# Patient Record
Sex: FEMALE | Race: WHITE | Employment: OTHER | URBAN - METROPOLITAN AREA
[De-identification: names, ages, dates, MRNs, and addresses within clinical notes are randomized per-mention and may not be internally consistent; named-entity substitution may affect disease eponyms.]

---

## 2017-01-06 ENCOUNTER — OFFICE VISIT (OUTPATIENT)
Dept: INTERNAL MEDICINE CLINIC | Facility: CLINIC | Age: 72
End: 2017-01-06

## 2017-01-06 VITALS
HEIGHT: 60 IN | BODY MASS INDEX: 32.2 KG/M2 | DIASTOLIC BLOOD PRESSURE: 70 MMHG | WEIGHT: 164 LBS | SYSTOLIC BLOOD PRESSURE: 138 MMHG | HEART RATE: 83 BPM

## 2017-01-06 DIAGNOSIS — E78.00 HYPERCHOLESTEROLEMIA: Primary | ICD-10-CM

## 2017-01-06 DIAGNOSIS — R03.0 ELEVATED BP WITHOUT DIAGNOSIS OF HYPERTENSION: ICD-10-CM

## 2017-01-06 LAB
ALBUMIN SERPL BCP-MCNC: 4 G/DL (ref 3.5–4.8)
ALBUMIN/GLOB SERPL: 1.6 {RATIO} (ref 1–2)
ALP SERPL-CCNC: 59 U/L (ref 32–100)
ALT SERPL-CCNC: 18 U/L (ref 14–54)
ANION GAP SERPL CALC-SCNC: 9 MMOL/L (ref 0–18)
AST SERPL-CCNC: 17 U/L (ref 15–41)
BILIRUB SERPL-MCNC: 0.7 MG/DL (ref 0.3–1.2)
BUN SERPL-MCNC: 15 MG/DL (ref 8–20)
BUN/CREAT SERPL: 20.3 (ref 10–20)
CALCIUM SERPL-MCNC: 9.1 MG/DL (ref 8.5–10.5)
CHLORIDE SERPL-SCNC: 107 MMOL/L (ref 95–110)
CHOLEST SERPL-MCNC: 227 MG/DL (ref 110–200)
CO2 SERPL-SCNC: 25 MMOL/L (ref 22–32)
CREAT SERPL-MCNC: 0.74 MG/DL (ref 0.5–1.5)
GLOBULIN PLAS-MCNC: 2.5 G/DL (ref 2.5–3.7)
GLUCOSE SERPL-MCNC: 93 MG/DL (ref 70–99)
HDLC SERPL-MCNC: 60 MG/DL
LDLC SERPL CALC-MCNC: 134 MG/DL (ref 0–99)
NONHDLC SERPL-MCNC: 167 MG/DL
OSMOLALITY UR CALC.SUM OF ELEC: 293 MOSM/KG (ref 275–295)
POTASSIUM SERPL-SCNC: 4.1 MMOL/L (ref 3.3–5.1)
PROT SERPL-MCNC: 6.5 G/DL (ref 5.9–8.4)
SODIUM SERPL-SCNC: 141 MMOL/L (ref 136–144)
TRIGL SERPL-MCNC: 165 MG/DL (ref 1–149)

## 2017-01-06 PROCEDURE — G0463 HOSPITAL OUTPT CLINIC VISIT: HCPCS | Performed by: INTERNAL MEDICINE

## 2017-01-06 PROCEDURE — 99213 OFFICE O/P EST LOW 20 MIN: CPT | Performed by: INTERNAL MEDICINE

## 2017-01-06 NOTE — PATIENT INSTRUCTIONS
ASSESSMENT/PLAN:   Diagnoses and all orders for this visit:    Hypercholesterolemia  Cholesterol needs to be rechecked. Elevated BP without diagnosis of hypertension  BP doing well, continue to monitor.

## 2017-01-06 NOTE — PROGRESS NOTES
HPI:    Patient ID: Peña Owens is a 70year old female. HPI   Elevated BP  Patient is here for follow up of her elevated BP.  BP at home: not checking  On no meds, significant stress from her daughter who is bipolar alcoholic Exercise level: somewha Rash  Nitrofurantoin Macr*        Comment:Other reaction(s): NITROFURANTOIN MACROCRYSTAL  PHYSICAL EXAM:    Physical Exam   Vitals reviewed. Constitutional: She appears well-developed and well-nourished. HENT:   Head: Normocephalic and atraumatic.    Ca

## 2017-07-07 ENCOUNTER — OFFICE VISIT (OUTPATIENT)
Dept: INTERNAL MEDICINE CLINIC | Facility: CLINIC | Age: 72
End: 2017-07-07

## 2017-07-07 VITALS
HEART RATE: 63 BPM | DIASTOLIC BLOOD PRESSURE: 76 MMHG | SYSTOLIC BLOOD PRESSURE: 132 MMHG | BODY MASS INDEX: 32.98 KG/M2 | TEMPERATURE: 98 F | WEIGHT: 168 LBS | HEIGHT: 60 IN

## 2017-07-07 DIAGNOSIS — E78.00 HYPERCHOLESTEROLEMIA: Primary | ICD-10-CM

## 2017-07-07 LAB
CHOLEST SERPL-MCNC: 241 MG/DL (ref 110–200)
HDLC SERPL-MCNC: 51 MG/DL
LDLC SERPL CALC-MCNC: 148 MG/DL (ref 0–99)
NONHDLC SERPL-MCNC: 190 MG/DL
TRIGL SERPL-MCNC: 209 MG/DL (ref 1–149)

## 2017-07-07 PROCEDURE — 36415 COLL VENOUS BLD VENIPUNCTURE: CPT | Performed by: INTERNAL MEDICINE

## 2017-07-07 PROCEDURE — G0463 HOSPITAL OUTPT CLINIC VISIT: HCPCS | Performed by: INTERNAL MEDICINE

## 2017-07-07 PROCEDURE — G0009 ADMIN PNEUMOCOCCAL VACCINE: HCPCS | Performed by: INTERNAL MEDICINE

## 2017-07-07 PROCEDURE — 99213 OFFICE O/P EST LOW 20 MIN: CPT | Performed by: INTERNAL MEDICINE

## 2017-07-07 PROCEDURE — 90670 PCV13 VACCINE IM: CPT | Performed by: INTERNAL MEDICINE

## 2017-07-07 RX ORDER — FLUOCINONIDE 0.5 MG/G
OINTMENT TOPICAL
COMMUNITY
Start: 2017-06-28 | End: 2020-11-15 | Stop reason: ALTCHOICE

## 2017-07-07 NOTE — PROGRESS NOTES
HPI:    Patient ID: Nneka Dowling is a 67year old female. HPI  Hyperlipidemia  Patient here to follow up for their elevated cholesterol. Patient has been following low fat diet. Current medication no meds, trying to be more active.   Last cholesterol

## 2017-10-25 ENCOUNTER — OFFICE VISIT (OUTPATIENT)
Dept: INTERNAL MEDICINE CLINIC | Facility: CLINIC | Age: 72
End: 2017-10-25

## 2017-10-25 VITALS
SYSTOLIC BLOOD PRESSURE: 144 MMHG | HEIGHT: 60 IN | BODY MASS INDEX: 33.18 KG/M2 | HEART RATE: 78 BPM | TEMPERATURE: 98 F | WEIGHT: 169 LBS | DIASTOLIC BLOOD PRESSURE: 84 MMHG | OXYGEN SATURATION: 97 %

## 2017-10-25 DIAGNOSIS — M89.9 DISORDER OF BONE: ICD-10-CM

## 2017-10-25 DIAGNOSIS — R07.89 LEFT-SIDED CHEST WALL PAIN: Primary | ICD-10-CM

## 2017-10-25 DIAGNOSIS — M85.80 SENILE OSTEOPENIA: ICD-10-CM

## 2017-10-25 DIAGNOSIS — M85.859 OSTEOPENIA OF HIP: ICD-10-CM

## 2017-10-25 PROCEDURE — G0463 HOSPITAL OUTPT CLINIC VISIT: HCPCS | Performed by: INTERNAL MEDICINE

## 2017-10-25 PROCEDURE — 99213 OFFICE O/P EST LOW 20 MIN: CPT | Performed by: INTERNAL MEDICINE

## 2017-10-25 NOTE — PATIENT INSTRUCTIONS
ASSESSMENT/PLAN:   Diagnoses and all orders for this visit:    Left-sided chest wall pain  -     XR RIBS WITH CHEST (3 VIEWS), LEFT  (CPT=71101); Future  Patient with mechanical fall, possible rib fracture, should have x-ray of her ribs.  Continue the motri

## 2017-10-25 NOTE — PROGRESS NOTES
HPI:    Patient ID: Harriette Gitelman is a 67year old female. HPI  Fall at home  Patient was outside gardening 1 week ago. She stepped on loose dirt and struck her left chest on a landscaping wall. Has persistent pain in the area.  Taking ibuprofen for th

## 2017-10-26 ENCOUNTER — HOSPITAL ENCOUNTER (OUTPATIENT)
Dept: GENERAL RADIOLOGY | Age: 72
Discharge: HOME | End: 2017-10-26
Attending: INTERNAL MEDICINE
Payer: MEDICARE

## 2017-10-26 DIAGNOSIS — R07.89 LEFT-SIDED CHEST WALL PAIN: ICD-10-CM

## 2017-10-26 PROCEDURE — 71101 X-RAY EXAM UNILAT RIBS/CHEST: CPT | Performed by: INTERNAL MEDICINE

## 2017-10-30 ENCOUNTER — HOSPITAL ENCOUNTER (OUTPATIENT)
Dept: MAMMOGRAPHY | Age: 72
Discharge: HOME | End: 2017-10-30
Attending: OBSTETRICS & GYNECOLOGY
Payer: MEDICARE

## 2017-10-30 DIAGNOSIS — Z12.31 ENCOUNTER FOR SCREENING MAMMOGRAM FOR MALIGNANT NEOPLASM OF BREAST: ICD-10-CM

## 2017-10-30 PROCEDURE — 77067 SCR MAMMO BI INCL CAD: CPT | Performed by: OBSTETRICS & GYNECOLOGY

## 2017-11-06 ENCOUNTER — HOSPITAL ENCOUNTER (OUTPATIENT)
Dept: BONE DENSITY | Age: 72
Discharge: HOME | End: 2017-11-06
Attending: INTERNAL MEDICINE
Payer: MEDICARE

## 2017-11-06 DIAGNOSIS — M85.80 SENILE OSTEOPENIA: ICD-10-CM

## 2017-11-06 DIAGNOSIS — M89.9 DISORDER OF BONE: ICD-10-CM

## 2017-11-06 DIAGNOSIS — M85.859 OSTEOPENIA OF HIP: ICD-10-CM

## 2017-11-06 PROCEDURE — 77080 DXA BONE DENSITY AXIAL: CPT | Performed by: INTERNAL MEDICINE

## 2018-01-05 ENCOUNTER — OFFICE VISIT (OUTPATIENT)
Dept: INTERNAL MEDICINE CLINIC | Facility: CLINIC | Age: 73
End: 2018-01-05

## 2018-01-05 VITALS
WEIGHT: 169 LBS | HEART RATE: 69 BPM | DIASTOLIC BLOOD PRESSURE: 86 MMHG | HEIGHT: 60 IN | SYSTOLIC BLOOD PRESSURE: 145 MMHG | BODY MASS INDEX: 33.18 KG/M2

## 2018-01-05 DIAGNOSIS — E78.00 HYPERCHOLESTEROLEMIA: Primary | ICD-10-CM

## 2018-01-05 DIAGNOSIS — R03.0 ELEVATED BP WITHOUT DIAGNOSIS OF HYPERTENSION: ICD-10-CM

## 2018-01-05 LAB
ALBUMIN SERPL BCP-MCNC: 4 G/DL (ref 3.5–4.8)
ALBUMIN/GLOB SERPL: 1.7 {RATIO} (ref 1–2)
ALP SERPL-CCNC: 54 U/L (ref 32–100)
ALT SERPL-CCNC: 21 U/L (ref 14–54)
ANION GAP SERPL CALC-SCNC: 8 MMOL/L (ref 0–18)
AST SERPL-CCNC: 17 U/L (ref 15–41)
BILIRUB SERPL-MCNC: 0.5 MG/DL (ref 0.3–1.2)
BUN SERPL-MCNC: 13 MG/DL (ref 8–20)
BUN/CREAT SERPL: 17.1 (ref 10–20)
CALCIUM SERPL-MCNC: 9.1 MG/DL (ref 8.5–10.5)
CHLORIDE SERPL-SCNC: 107 MMOL/L (ref 95–110)
CHOLEST SERPL-MCNC: 243 MG/DL (ref 110–200)
CO2 SERPL-SCNC: 24 MMOL/L (ref 22–32)
CREAT SERPL-MCNC: 0.76 MG/DL (ref 0.5–1.5)
GLOBULIN PLAS-MCNC: 2.4 G/DL (ref 2.5–3.7)
GLUCOSE SERPL-MCNC: 92 MG/DL (ref 70–99)
HDLC SERPL-MCNC: 57 MG/DL
LDLC SERPL CALC-MCNC: 163 MG/DL (ref 0–99)
NONHDLC SERPL-MCNC: 186 MG/DL
OSMOLALITY UR CALC.SUM OF ELEC: 288 MOSM/KG (ref 275–295)
POTASSIUM SERPL-SCNC: 4.7 MMOL/L (ref 3.3–5.1)
PROT SERPL-MCNC: 6.4 G/DL (ref 5.9–8.4)
SODIUM SERPL-SCNC: 139 MMOL/L (ref 136–144)
TRIGL SERPL-MCNC: 114 MG/DL (ref 1–149)

## 2018-01-05 PROCEDURE — 36415 COLL VENOUS BLD VENIPUNCTURE: CPT | Performed by: INTERNAL MEDICINE

## 2018-01-05 PROCEDURE — 99213 OFFICE O/P EST LOW 20 MIN: CPT | Performed by: INTERNAL MEDICINE

## 2018-01-05 PROCEDURE — G0463 HOSPITAL OUTPT CLINIC VISIT: HCPCS | Performed by: INTERNAL MEDICINE

## 2018-01-05 RX ORDER — ATORVASTATIN CALCIUM 20 MG/1
20 TABLET, FILM COATED ORAL NIGHTLY
Qty: 30 TABLET | Refills: 3 | Status: SHIPPED | OUTPATIENT
Start: 2018-01-05 | End: 2018-04-29

## 2018-01-05 NOTE — PROGRESS NOTES
HPI:    Patient ID: Zaki Langley is a 67year old female. HPI  Elevated BP  Patient is here for follow up of elevated BP. BP at home: runs 130-140  Has been compliant with medications.   Exercise level: not active and has not been following low salt d 0.05 % External Ointment  Disp:  Rfl:      Allergies:  Amoxicillin                 Comment:Other reaction(s): Hives/Skin Rash  Nitrofurantoin Macr*        Comment:Other reaction(s): NITROFURANTOIN MACROCRYSTAL  PHYSICAL EXAM:    Physical Exam   Vitals revi

## 2018-01-05 NOTE — PATIENT INSTRUCTIONS
ASSESSMENT/PLAN:   Diagnoses and all orders for this visit:    Hypercholesterolemia  Cholesterol needs to be rechecked. Elevated BP without diagnosis of hypertension  BP too high, needs to watch diet, exercise more regularly and monitor at home.  Will f/u

## 2018-01-08 ENCOUNTER — PATIENT MESSAGE (OUTPATIENT)
Dept: INTERNAL MEDICINE CLINIC | Facility: CLINIC | Age: 73
End: 2018-01-08

## 2018-01-08 DIAGNOSIS — Z01.419 ENCOUNTER FOR ANNUAL ROUTINE GYNECOLOGICAL EXAMINATION: Primary | ICD-10-CM

## 2018-01-24 ENCOUNTER — OFFICE VISIT (OUTPATIENT)
Dept: INTERNAL MEDICINE CLINIC | Facility: CLINIC | Age: 73
End: 2018-01-24

## 2018-01-24 VITALS
HEART RATE: 80 BPM | HEIGHT: 60 IN | SYSTOLIC BLOOD PRESSURE: 136 MMHG | WEIGHT: 168 LBS | TEMPERATURE: 98 F | DIASTOLIC BLOOD PRESSURE: 80 MMHG | BODY MASS INDEX: 32.98 KG/M2

## 2018-01-24 DIAGNOSIS — L73.9 FOLLICULITIS: Primary | ICD-10-CM

## 2018-01-24 PROCEDURE — G0463 HOSPITAL OUTPT CLINIC VISIT: HCPCS | Performed by: INTERNAL MEDICINE

## 2018-01-24 PROCEDURE — 99213 OFFICE O/P EST LOW 20 MIN: CPT | Performed by: INTERNAL MEDICINE

## 2018-01-24 NOTE — PATIENT INSTRUCTIONS
ASSESSMENT/PLAN:   Diagnoses and all orders for this visit:    Folliculitis    patient with mild folliculitis, recommend that she use topical antibiotic salve like polysporin twice daily

## 2018-04-29 NOTE — TELEPHONE ENCOUNTER
Please review and advise regarding pended refill request as unable to refill per protocol.     Cholesterol Medications  Protocol Criteria:  · Appointment scheduled in the past 12 months or in the next 3 months  · ALT & LDL on file in the past 12 months  · A

## 2018-04-30 RX ORDER — ATORVASTATIN CALCIUM 20 MG/1
TABLET, FILM COATED ORAL
Qty: 90 TABLET | Refills: 1 | Status: SHIPPED | OUTPATIENT
Start: 2018-04-30 | End: 2018-06-01

## 2018-06-01 ENCOUNTER — OFFICE VISIT (OUTPATIENT)
Dept: INTERNAL MEDICINE CLINIC | Facility: CLINIC | Age: 73
End: 2018-06-01

## 2018-06-01 VITALS
SYSTOLIC BLOOD PRESSURE: 138 MMHG | BODY MASS INDEX: 32.79 KG/M2 | HEART RATE: 69 BPM | DIASTOLIC BLOOD PRESSURE: 79 MMHG | HEIGHT: 60 IN | WEIGHT: 167 LBS

## 2018-06-01 DIAGNOSIS — E78.00 HYPERCHOLESTEROLEMIA: Primary | ICD-10-CM

## 2018-06-01 DIAGNOSIS — S05.11XA ECCHYMOSIS OF RIGHT EYE, INITIAL ENCOUNTER: ICD-10-CM

## 2018-06-01 PROCEDURE — 99213 OFFICE O/P EST LOW 20 MIN: CPT | Performed by: INTERNAL MEDICINE

## 2018-06-01 PROCEDURE — 36415 COLL VENOUS BLD VENIPUNCTURE: CPT | Performed by: INTERNAL MEDICINE

## 2018-06-01 RX ORDER — ATORVASTATIN CALCIUM 20 MG/1
TABLET, FILM COATED ORAL
Qty: 90 TABLET | Refills: 3 | Status: SHIPPED | OUTPATIENT
Start: 2018-06-01 | End: 2019-09-04

## 2018-06-01 NOTE — PATIENT INSTRUCTIONS
ASSESSMENT/PLAN:   Hypercholesterolemia  Cholesterol to be rechecked on her meds.  Overall doing well  ecchymosis  This appears to be traumatic, not a concern, will check blood count

## 2018-06-01 NOTE — PROGRESS NOTES
HPI:    Patient ID: Chanell Alvarez is a 68year old female. HPI  Eye bruise  Patient developed spontaneous bruise under her right eye after working in the yard. Denies any trauma, doesn't hurt.      Hyperlipidemia  Patient here to follow up for their el person, place, and time. Skin:   Mild bruising under right eye           ASSESSMENT/PLAN:   Hypercholesterolemia  Cholesterol to be rechecked on her meds.  Overall doing well  ecchymosis  This appears to be traumatic, not a concern, will check blood count

## 2018-11-13 ENCOUNTER — OFFICE VISIT (OUTPATIENT)
Dept: INTERNAL MEDICINE CLINIC | Facility: CLINIC | Age: 73
End: 2018-11-13
Payer: MEDICARE

## 2018-11-13 VITALS
OXYGEN SATURATION: 98 % | SYSTOLIC BLOOD PRESSURE: 134 MMHG | TEMPERATURE: 98 F | BODY MASS INDEX: 28 KG/M2 | DIASTOLIC BLOOD PRESSURE: 79 MMHG | HEART RATE: 71 BPM | WEIGHT: 145 LBS

## 2018-11-13 DIAGNOSIS — Z00.00 ENCOUNTER FOR ANNUAL HEALTH EXAMINATION: ICD-10-CM

## 2018-11-13 DIAGNOSIS — R03.0 ELEVATED BP WITHOUT DIAGNOSIS OF HYPERTENSION: ICD-10-CM

## 2018-11-13 DIAGNOSIS — Z12.39 BREAST CANCER SCREENING: ICD-10-CM

## 2018-11-13 DIAGNOSIS — E78.00 HYPERCHOLESTEROLEMIA: Primary | ICD-10-CM

## 2018-11-13 DIAGNOSIS — M85.80 SENILE OSTEOPENIA: ICD-10-CM

## 2018-11-13 DIAGNOSIS — Z00.00 ENCOUNTER FOR MEDICARE ANNUAL WELLNESS EXAM: ICD-10-CM

## 2018-11-13 DIAGNOSIS — N39.0 URINARY TRACT INFECTION WITHOUT HEMATURIA, SITE UNSPECIFIED: ICD-10-CM

## 2018-11-13 PROCEDURE — 81003 URINALYSIS AUTO W/O SCOPE: CPT | Performed by: INTERNAL MEDICINE

## 2018-11-13 PROCEDURE — G0009 ADMIN PNEUMOCOCCAL VACCINE: HCPCS | Performed by: INTERNAL MEDICINE

## 2018-11-13 PROCEDURE — G0439 PPPS, SUBSEQ VISIT: HCPCS | Performed by: INTERNAL MEDICINE

## 2018-11-13 PROCEDURE — 90732 PPSV23 VACC 2 YRS+ SUBQ/IM: CPT | Performed by: INTERNAL MEDICINE

## 2018-11-13 NOTE — PATIENT INSTRUCTIONS
[facility] SCREENING SCHEDULE   Tests on this list are recommended by your physician but may not be covered, or covered at this frequency, by your insurer. Please check with your insurance carrier before scheduling to verify coverage.    PREVENTATI Colorectal Cancer Screening  Covered up to Age 76     Colonoscopy Screen   Covered every 10 years- more often if abnormal Colonoscopy due on 10/23/2024 Update Health Maintenance if applicable    Flex Sigmoidoscopy Screen  Covered every 5 years No results visit on 07/07/17   • PNEUMOCOCCAL VACC, 13 DEANDRE IM    Please get once after your 65th birthday    Pneumococcal 23 (Pneumovax)  Covered Once after 65 No orders found for this or any previous visit.  Please get once after your 65th birthday    Hepatitis B for excercise at least 30 minutes 3-4 times a week. Check blood pressures at different times on different days. Can purchase own blood pressure monitor. If not, check at local pharmacy. Bake foods more and grill occasionally. Avoid fried foods. No salt.  Use ot

## 2018-11-13 NOTE — PROGRESS NOTES
HPI:    Patient ID: Jadyn Mckinley is a 68year old female. HPI  Jadyn Mckinley is a 68year old female who presents for a complete physical exam. New to me here to also establish care. PCP has left practice. HPI:   Patient presents with:   Well Ad PM    ALKPHO 49 06/01/2018 01:32 PM    AST 19 06/01/2018 01:32 PM    ALT 19 06/01/2018 01:32 PM    BILT 0.6 06/01/2018 01:32 PM        Lab Results   Component Value Date/Time    CHOLEST 146 06/01/2018 01:32 PM    HDL 57 06/01/2018 01:32 PM    TRIG 101 06/0 brenda        Comment: Socially      Drug use: No      Sexual activity: Not on file    Other Topics      Concerns:         Service: Not Asked        Blood Transfusions: Not Asked        Caffeine Concern: Yes          Coffee, 6 cups per day         Occu discharge and vaginal pain. Skin: Negative for rash. Allergic/Immunologic: Negative for environmental allergies.    Neurological: Negative for dizziness, tremors, seizures, syncope, facial asymmetry, speech difficulty, weakness, light-headedness, numbne normal. She appears well-developed and well-nourished. No distress. HENT:   Head: Normocephalic and atraumatic. Right Ear: Tympanic membrane, external ear and ear canal normal. No lacerations. No drainage, swelling or tenderness. No foreign bodies.  No not injected. Right conjunctiva has no hemorrhage. Left conjunctiva is not injected. Left conjunctiva has no hemorrhage. No scleral icterus. Right eye exhibits normal extraocular motion and no nystagmus.  Left eye exhibits normal extraocular motion and no n Head (right side): No submental, no submandibular, no tonsillar, no preauricular, no posterior auricular and no occipital adenopathy present.         Head (left side): No submental, no submandibular, no tonsillar, no preauricular, no posterior auricular and Initial Annual Wellness).     Fall/Risk Assessment   She has been screened for Falls and is low risk: Fall/Risk Scorin    Cognitive Assessment   She had a completely normal cognitive assessment- see flowsheet entries      Functional Ability/Status   Julius Readings from Last 3 Encounters:  11/13/18 : 145 lb (65.8 kg)  06/01/18 : 167 lb (75.8 kg)  01/24/18 : 168 lb (76.2 kg)     Last Cholesterol Labs:   Lab Results   Component Value Date    CHOLEST 146 06/01/2018    HDL 57 06/01/2018    LDL 69 06/01/2018    T following:    Height as of 6/1/18: 5' (1.524 m). Weight as of this encounter: 145 lb (65.8 kg).     Medicare Hearing Assessment  (Required for AWV/SWV)    Questionnaire       Visual Acuity  Right Eye Visual Acuity: Corrected Right Eye Chart Acuity: 20/25 Good  How do you maintain positive mental well-being?: Social Interaction;Games; Visiting Family;Puzzles; Visiting Friends      This section provided for quick review of chart, separate sheet to patient  [address] [place] [address],[address] Intern 9/28/2018 Please get every year    Pneumococcal 13 (Prevnar)  Covered Once after 65 07/07/2017 Please get once after your 65th birthday    Pneumococcal 23 (Pneumovax)  Covered Once after 65 No vaccine history found Please get once after your 65th birthday file in [address]    Advance care planning including the explanation and discussion of advance directives standard forms performed Face to Face with patient and Family/surrogate (if present), and forms available to patient in AVS         She smoked tobacco in t MEDICAL INFORMATION:   She  has a past medical history of Allergic rhinitis, Foot fracture, left, Other and unspecified hyperlipidemia, Psoriasis, and Uterine cancer ([facility] [address]     She  has a past surgical history that includes bso, omentectomy w/jt and hyst problem and pelvic pain. Skin: Negative for rash. Allergic/Immunologic: Negative for environmental allergies.    Neurological: Negative for dizziness, tremors, seizures, syncope, facial asymmetry, speech difficulty, weakness, light-headedness, numbness membrane, external ear and ear canal normal. No lacerations. No drainage, swelling or tenderness. No foreign bodies. No mastoid tenderness. Tympanic membrane is not injected, not scarred, not perforated, not erythematous, not retracted and not bulging.  Tym present. Cardiovascular: Normal rate, regular rhythm, S1 normal, S2 normal, normal heart sounds, intact distal pulses and normal pulses. Pulses:       Carotid pulses are 2+ on the right side, and 2+ on the left side.        Radial pulses are 2+ on the posterior cervical adenopathy present. She has no axillary adenopathy. Right axillary: No pectoral and no lateral adenopathy present. Left axillary: No pectoral and no lateral adenopathy present.        Right: No supraclavicular adenopathy of these issues and agrees to the plan. Reinforced healthy diet, lifestyle, and exercise. Return in about 1 year (around 11/13/2019), or if symptoms worsen or fail to improve. Jayme Castleman.  Brody Frost MD, 11/13/2018     General Health     In the past six older at high risk There are no preventive care reminders to display for this patient. Update Health Maintenance if applicable    Chlamydia  Annually if high risk No results found for: CHLAMYDIA No flowsheet data found.     Screening Mammogram      Stas Patrick

## 2018-12-06 ENCOUNTER — HOSPITAL ENCOUNTER (OUTPATIENT)
Dept: MAMMOGRAPHY | Age: 73
Discharge: HOME | End: 2018-12-06
Attending: INTERNAL MEDICINE
Payer: MEDICARE

## 2018-12-06 DIAGNOSIS — Z12.39 BREAST CANCER SCREENING: ICD-10-CM

## 2018-12-06 PROCEDURE — 77067 SCR MAMMO BI INCL CAD: CPT | Performed by: INTERNAL MEDICINE

## 2018-12-06 PROCEDURE — 77063 BREAST TOMOSYNTHESIS BI: CPT | Performed by: INTERNAL MEDICINE

## 2019-03-19 ENCOUNTER — TELEPHONE (OUTPATIENT)
Dept: INTERNAL MEDICINE CLINIC | Facility: CLINIC | Age: 74
End: 2019-03-19

## 2019-03-19 DIAGNOSIS — M85.80 SENILE OSTEOPENIA: ICD-10-CM

## 2019-03-19 DIAGNOSIS — E78.00 HYPERCHOLESTEROLEMIA: Primary | ICD-10-CM

## 2019-03-19 DIAGNOSIS — E55.9 VITAMIN D DEFICIENCY: ICD-10-CM

## 2019-03-19 NOTE — TELEPHONE ENCOUNTER
June 27th @ 2pm pt is coming to see Dr Daniella Chacon   Pt would like to have blood test before that day.   Please place in order and call pt when orders are done

## 2019-03-20 PROBLEM — E55.9 VITAMIN D DEFICIENCY: Status: ACTIVE | Noted: 2019-03-20

## 2019-03-20 NOTE — TELEPHONE ENCOUNTER
Patient notified by phone orders in computer may go have labs drawn at [facility] before appt.

## 2019-03-25 ENCOUNTER — APPOINTMENT (OUTPATIENT)
Dept: LAB | Facility: HOSPITAL | Age: 74
End: 2019-03-25
Attending: INTERNAL MEDICINE
Payer: MEDICARE

## 2019-03-25 DIAGNOSIS — E55.9 VITAMIN D DEFICIENCY: ICD-10-CM

## 2019-03-25 DIAGNOSIS — E78.00 HYPERCHOLESTEROLEMIA: ICD-10-CM

## 2019-03-25 DIAGNOSIS — M85.80 SENILE OSTEOPENIA: ICD-10-CM

## 2019-03-25 LAB
25(OH)D3 SERPL-MCNC: 21.2 NG/ML (ref 30–100)
ALBUMIN SERPL-MCNC: 3.6 G/DL (ref 3.4–5)
ALBUMIN/GLOB SERPL: 1.2 {RATIO} (ref 1–2)
ALP LIVER SERPL-CCNC: 60 U/L (ref 55–142)
ALT SERPL-CCNC: 43 U/L (ref 13–56)
ANION GAP SERPL CALC-SCNC: 5 MMOL/L (ref 0–18)
AST SERPL-CCNC: 29 U/L (ref 15–37)
BILIRUB SERPL-MCNC: 0.3 MG/DL (ref 0.1–2)
BUN BLD-MCNC: 27 MG/DL (ref 7–18)
BUN/CREAT SERPL: 39.1 (ref 10–20)
CALCIUM BLD-MCNC: 9 MG/DL (ref 8.5–10.1)
CHLORIDE SERPL-SCNC: 111 MMOL/L (ref 98–107)
CHOLEST SMN-MCNC: 236 MG/DL (ref ?–200)
CO2 SERPL-SCNC: 26 MMOL/L (ref 21–32)
CREAT BLD-MCNC: 0.69 MG/DL (ref 0.55–1.02)
GLOBULIN PLAS-MCNC: 2.9 G/DL (ref 2.8–4.4)
GLUCOSE BLD-MCNC: 96 MG/DL (ref 70–99)
HDLC SERPL-MCNC: 71 MG/DL (ref 40–59)
LDLC SERPL CALC-MCNC: 153 MG/DL (ref ?–100)
M PROTEIN MFR SERPL ELPH: 6.5 G/DL (ref 6.4–8.2)
NONHDLC SERPL-MCNC: 165 MG/DL (ref ?–130)
OSMOLALITY SERPL CALC.SUM OF ELEC: 299 MOSM/KG (ref 275–295)
POTASSIUM SERPL-SCNC: 4.6 MMOL/L (ref 3.5–5.1)
SODIUM SERPL-SCNC: 142 MMOL/L (ref 136–145)
TRIGL SERPL-MCNC: 62 MG/DL (ref 30–149)
VLDLC SERPL CALC-MCNC: 12 MG/DL (ref 0–30)

## 2019-03-25 PROCEDURE — 80053 COMPREHEN METABOLIC PANEL: CPT

## 2019-03-25 PROCEDURE — 36415 COLL VENOUS BLD VENIPUNCTURE: CPT

## 2019-03-25 PROCEDURE — 80061 LIPID PANEL: CPT

## 2019-03-25 PROCEDURE — 82306 VITAMIN D 25 HYDROXY: CPT

## 2019-06-27 ENCOUNTER — OFFICE VISIT (OUTPATIENT)
Dept: INTERNAL MEDICINE CLINIC | Facility: CLINIC | Age: 74
End: 2019-06-27
Payer: MEDICARE

## 2019-06-27 VITALS
DIASTOLIC BLOOD PRESSURE: 77 MMHG | HEART RATE: 64 BPM | TEMPERATURE: 98 F | HEIGHT: 60 IN | SYSTOLIC BLOOD PRESSURE: 124 MMHG | OXYGEN SATURATION: 98 % | WEIGHT: 142 LBS | BODY MASS INDEX: 27.88 KG/M2

## 2019-06-27 DIAGNOSIS — E55.9 VITAMIN D DEFICIENCY: ICD-10-CM

## 2019-06-27 DIAGNOSIS — E78.00 HYPERCHOLESTEROLEMIA: Primary | ICD-10-CM

## 2019-06-27 PROCEDURE — 36415 COLL VENOUS BLD VENIPUNCTURE: CPT | Performed by: INTERNAL MEDICINE

## 2019-06-27 PROCEDURE — 99213 OFFICE O/P EST LOW 20 MIN: CPT | Performed by: INTERNAL MEDICINE

## 2019-06-27 NOTE — PATIENT INSTRUCTIONS
ASSESSMENT/PLAN:   Hypercholesterolemia  (primary encounter diagnosis) Check blood. Vitamin d deficiency Check blood. No orders of the defined types were placed in this encounter.       Meds This Visit:  Requested Prescriptions      No prescriptio

## 2019-07-07 ENCOUNTER — PATIENT MESSAGE (OUTPATIENT)
Dept: INTERNAL MEDICINE CLINIC | Facility: CLINIC | Age: 74
End: 2019-07-07

## 2019-07-08 NOTE — TELEPHONE ENCOUNTER
From: Harriette Gitelman  To: Jose E Azevedo MD  Sent: 7/7/2019 7:30 PM CDT  Subject: Referral Request    Dr. Keisha Birch,  About a year ago my gynecologist retired. Can you suggest a female gynecologist with offices at [facility]?    Thank you so much.

## 2019-08-29 ENCOUNTER — OFFICE VISIT (OUTPATIENT)
Dept: OBGYN CLINIC | Facility: CLINIC | Age: 74
End: 2019-08-29
Payer: MEDICARE

## 2019-08-29 VITALS
SYSTOLIC BLOOD PRESSURE: 137 MMHG | HEART RATE: 60 BPM | WEIGHT: 142 LBS | DIASTOLIC BLOOD PRESSURE: 81 MMHG | HEIGHT: 59.5 IN | BODY MASS INDEX: 28.25 KG/M2

## 2019-08-29 DIAGNOSIS — Z01.419 ENCOUNTER FOR GYNECOLOGICAL EXAMINATION WITHOUT ABNORMAL FINDING: Primary | ICD-10-CM

## 2019-08-29 DIAGNOSIS — N39.46 MIXED STRESS AND URGE URINARY INCONTINENCE: ICD-10-CM

## 2019-08-29 DIAGNOSIS — Z12.31 VISIT FOR SCREENING MAMMOGRAM: ICD-10-CM

## 2019-08-29 PROCEDURE — G0101 CA SCREEN;PELVIC/BREAST EXAM: HCPCS | Performed by: OBSTETRICS & GYNECOLOGY

## 2019-08-29 PROCEDURE — 99212 OFFICE O/P EST SF 10 MIN: CPT | Performed by: OBSTETRICS & GYNECOLOGY

## 2019-08-29 NOTE — PROGRESS NOTES
Betty Alexander is a 76year old female  No LMP recorded. Patient has had a hysterectomy. who presents for Patient presents with:  Gyn Exam: NP, Annual  She is a former patient of Jone Walker.   Pt also c/o leakage if urine when she coughs of lifts connections:        Talks on phone: Not on file        Gets together: Not on file        Attends Episcopal service: Not on file        Active member of club or organization: Not on file        Attends meetings of clubs or organizations: Not on file shortness of breath  Gastrointestinal:  denies heartburn, abdominal pain, diarrhea or constipation  Genitourinary:  denies dysuria, incontinence, abnormal vaginal discharge, vaginal itching  Musculoskeletal:  denies back pain.   Skin/Breast:  Denies any moses diagnosis)  Visit for screening mammogram  No orders of the defined types were placed in this encounter.     Requested Prescriptions      No prescriptions requested or ordered in this encounter     DYLLAN SCREENING BILAT (CPT=77067)

## 2019-09-04 RX ORDER — ATORVASTATIN CALCIUM 20 MG/1
TABLET, FILM COATED ORAL
Qty: 90 TABLET | Refills: 1 | Status: SHIPPED | OUTPATIENT
Start: 2019-09-04 | End: 2020-04-21

## 2019-09-04 NOTE — TELEPHONE ENCOUNTER
Refill passed per [facility] protocol.   Cholesterol Medications  Protocol Criteria:  · Appointment scheduled in the past 12 months or in the next 3 months  · ALT & LDL on file in the past 12 months  · ALT result < 80  · LDL result <130   Recent Outpat

## 2019-11-12 ENCOUNTER — OFFICE VISIT (OUTPATIENT)
Dept: INTERNAL MEDICINE CLINIC | Facility: CLINIC | Age: 74
End: 2019-11-12
Payer: MEDICARE

## 2019-11-12 VITALS
DIASTOLIC BLOOD PRESSURE: 72 MMHG | SYSTOLIC BLOOD PRESSURE: 133 MMHG | HEIGHT: 59 IN | WEIGHT: 144.19 LBS | HEART RATE: 68 BPM | OXYGEN SATURATION: 98 % | RESPIRATION RATE: 19 BRPM | TEMPERATURE: 97 F | BODY MASS INDEX: 29.07 KG/M2

## 2019-11-12 DIAGNOSIS — R03.0 ELEVATED BP WITHOUT DIAGNOSIS OF HYPERTENSION: ICD-10-CM

## 2019-11-12 DIAGNOSIS — Z71.85 VACCINE COUNSELING: ICD-10-CM

## 2019-11-12 DIAGNOSIS — E78.00 HYPERCHOLESTEROLEMIA: Primary | ICD-10-CM

## 2019-11-12 DIAGNOSIS — E55.9 VITAMIN D DEFICIENCY: ICD-10-CM

## 2019-11-12 DIAGNOSIS — Z00.00 ENCOUNTER FOR ANNUAL HEALTH EXAMINATION: ICD-10-CM

## 2019-11-12 PROCEDURE — 36415 COLL VENOUS BLD VENIPUNCTURE: CPT | Performed by: INTERNAL MEDICINE

## 2019-11-12 PROCEDURE — 99497 ADVNCD CARE PLAN 30 MIN: CPT | Performed by: INTERNAL MEDICINE

## 2019-11-12 PROCEDURE — G0439 PPPS, SUBSEQ VISIT: HCPCS | Performed by: INTERNAL MEDICINE

## 2019-11-12 NOTE — PATIENT INSTRUCTIONS
ASSESSMENT AND OTHER RELEVANT CHRONIC CONDITIONS:   Agatha Biggs is a 76year old female who presents for a Medicare Assessment.      PLAN SUMMARY:   Diagnoses and all orders for this visit:    Hypercholesterolemia  -     COMP METABOLIC PANEL (14)    Vac Routine EKG is not a screening covered service except at the Welcome to Medicare Visit    Abdominal aortic aneurysm screening (once between ages 73-68) IPPE only No results found for this or any previous visit.  Limited to patients who meet one of the johno CHLAMYDIA No flowsheet data found.     Screening Mammogram      Mammogram    Recommend Annually to at least age 76, and as needed after 76 Mammogram due on 12/06/2019 Please get this Mammogram regularly   Immunizations      Influenza  Covered Annually No or print using their home computer and printer. (the forms are also available in [address])  [email or web address]  This link also has information from the [facility] regarding Advance Directives.

## 2019-11-12 NOTE — PROGRESS NOTES
HPI:    Patient ID: Billy Briscoe is a 76year old female. HPI     Billy Briscoe is a 76year old female who presents for a complete physical exam.   HPI:   Patient presents with:  Wellness Visit: medicare annual well visit     Sees dermJv Christine.  06/27/2019 03:00 PM    CO2 25.0 06/27/2019 03:00 PM    CREATSERUM 0.73 06/27/2019 03:00 PM    CA 9.0 06/27/2019 03:00 PM    ALB 3.8 06/27/2019 03:00 PM    TP 6.7 06/27/2019 03:00 PM    ALKPHO 81 06/27/2019 03:00 PM    AST 52 (H) 06/27/2019 03:00 PM Alcohol/week: 0.0 standard drinks        Comment: Socially      Drug use: No      Sexual activity: Not Currently        Partners: Male    Other Topics      Concerns:        Caffeine Concern: Yes          Coffee, 6 cups per day          OB History   G Psychiatric/Behavioral: Negative for agitation, behavioral problems, confusion, decreased concentration, dysphoric mood, hallucinations, self-injury, sleep disturbance and suicidal ideas. The patient is not nervous/anxious and is not hyperactive.     All ot Right Ear: Tympanic membrane, external ear and ear canal normal. No lacerations. No drainage, swelling or tenderness. No foreign bodies. No mastoid tenderness.  Tympanic membrane is not injected, not scarred, not perforated, not erythematous, not retracted Eyes: Pupils are equal, round, and reactive to light. Conjunctivae, EOM and lids are normal. Right eye exhibits no chemosis, no discharge, no exudate and no hordeolum. No foreign body present in the right eye.  Left eye exhibits no chemosis, no discharge, n Abdominal: Soft. Bowel sounds are normal. She exhibits no distension, no fluid wave, no ascites and no mass. There is no hepatosplenomegaly, splenomegaly or hepatomegaly. There is no tenderness.  There is no rigidity, no rebound, no guarding and no CVA tend No prescriptions requested or ordered in this encounter       Imaging & Referrals:  None        ID#[id number]    HPI:   Stewart Rivers is a 76year old female who presents for a Medicare Subsequent Annual Wellness visit (Pt already had Initial Annual Wellness) Senile osteopenia     Psoriasis and similar disorder     Seborrheic dermatitis     Diverticulosis of colon     Elevated BP without diagnosis of hypertension     Vitamin D deficiency     Vaccine counseling    Wt Readings from Last 3 Encounters:  11/12/19 /72 (BP Location: Right arm, Patient Position: Sitting, Cuff Size: adult)   Pulse 68   Temp 97.4 °F (36.3 °C) (Oral)   Resp 19   Ht 4' 11\" (1.499 m)   Wt 144 lb 3.2 oz (65.4 kg)   SpO2 98%   BMI 29.12 kg/m²  Estimated body mass index is 29.12 kg/m² Immunization History   Administered Date(s) Administered   • FLU VACC High Dose 65 YRS & Older PRSV Free (23420) 10/06/2016   • Fluzone Vaccine Medicare () 10/06/2016   • Influenza 09/27/2017, 09/28/2018, 10/04/2019   • Pneumococcal (Prevnar 13) 07/07 06/27/2019 74        EKG - w/ Initial Preventative Physical Exam only, or if medically necessary Electrocardiogram date       Colorectal Cancer Screening      Colonoscopy Screen every 10 years Colonoscopy due on 10/23/2024 Update Health Maintenance if appl Only covered with a cut with metal- TD and TDaP Not covered by Medicare Part B 01/01/1998 This may be covered with your prescription benefits, but Medicare does not cover unless Medically needed    Zoster  Not covered by Medicare Part B No vaccine history

## 2019-12-03 ENCOUNTER — PATIENT MESSAGE (OUTPATIENT)
Dept: INTERNAL MEDICINE CLINIC | Facility: CLINIC | Age: 74
End: 2019-12-03

## 2019-12-03 DIAGNOSIS — H53.9 VISION ABNORMALITIES: Primary | ICD-10-CM

## 2019-12-10 ENCOUNTER — HOSPITAL ENCOUNTER (OUTPATIENT)
Dept: MAMMOGRAPHY | Age: 74
Discharge: HOME | End: 2019-12-10
Attending: OBSTETRICS & GYNECOLOGY
Payer: MEDICARE

## 2019-12-10 DIAGNOSIS — Z12.31 VISIT FOR SCREENING MAMMOGRAM: ICD-10-CM

## 2019-12-10 PROCEDURE — 77067 SCR MAMMO BI INCL CAD: CPT | Performed by: OBSTETRICS & GYNECOLOGY

## 2019-12-10 PROCEDURE — 77063 BREAST TOMOSYNTHESIS BI: CPT | Performed by: OBSTETRICS & GYNECOLOGY

## 2019-12-26 ENCOUNTER — HOSPITAL ENCOUNTER (OUTPATIENT)
Dept: ULTRASOUND IMAGING | Age: 74
Discharge: HOME | End: 2019-12-26
Attending: INTERNAL MEDICINE
Payer: MEDICARE

## 2019-12-26 ENCOUNTER — TELEPHONE (OUTPATIENT)
Dept: OTHER | Age: 74
End: 2019-12-26

## 2019-12-26 DIAGNOSIS — R74.8 ELEVATED LIVER ENZYMES: ICD-10-CM

## 2019-12-26 PROCEDURE — 76705 ECHO EXAM OF ABDOMEN: CPT | Performed by: INTERNAL MEDICINE

## 2019-12-26 NOTE — TELEPHONE ENCOUNTER
Lanney Holter from [address] [place] [address] called stating pt is scheduled for an MRI and needs copy of [facility] faxed to . Results faxed as requested.

## 2020-01-03 ENCOUNTER — PATIENT MESSAGE (OUTPATIENT)
Dept: INTERNAL MEDICINE CLINIC | Facility: CLINIC | Age: 75
End: 2020-01-03

## 2020-01-03 NOTE — TELEPHONE ENCOUNTER
Routed to Dr. Mer Carey  for advise, thanks.       Future Appointments   Date Time Provider Deonte Braun   1/9/2020 11:00 AM [facility]

## 2020-01-03 NOTE — TELEPHONE ENCOUNTER
From: Billy Briscoe  To: Nani Nguyễn. Daniella Chacon MD  Sent: 1/3/2020 3:53 PM CST  Subject: Non-Urgent Medical Question    I had an MRI done on December 29. Did you receive the results yet? Thank you. Efraín Banuelos

## 2020-01-04 NOTE — TELEPHONE ENCOUNTER
Office staff=please check if you receive the MRI results and give it to DR Barber Hernandez for review. Dr Thomas=please review the results and advise.

## 2020-01-06 ENCOUNTER — MED REC SCAN ONLY (OUTPATIENT)
Dept: INTERNAL MEDICINE CLINIC | Facility: CLINIC | Age: 75
End: 2020-01-06

## 2020-01-06 ENCOUNTER — TELEPHONE (OUTPATIENT)
Dept: INTERNAL MEDICINE CLINIC | Facility: CLINIC | Age: 75
End: 2020-01-06

## 2020-01-06 NOTE — TELEPHONE ENCOUNTER
Received results of MRI of abdomen today 1-6-20. MRI shows atypical hemangiomas which are benign lesions in the liver. Would recommend a follow-up in 6 months just to see if there is any changes.   If patient can bring her ultrasound done to the MRI aditya

## 2020-01-06 NOTE — TELEPHONE ENCOUNTER
Ad[facility] information and [facility]. Patient verbalized understanding.  She will call back in 6 for the order to follow-up, and will want to have it at [facility].

## 2020-03-24 ENCOUNTER — TELEPHONE (OUTPATIENT)
Dept: OPTOMETRY | Facility: CLINIC | Age: 75
End: 2020-03-24

## 2020-04-21 RX ORDER — ATORVASTATIN CALCIUM 20 MG/1
TABLET, FILM COATED ORAL
Qty: 90 TABLET | Refills: 1 | Status: SHIPPED | OUTPATIENT
Start: 2020-04-21 | End: 2020-11-19

## 2020-06-09 ENCOUNTER — PATIENT MESSAGE (OUTPATIENT)
Dept: INTERNAL MEDICINE CLINIC | Facility: CLINIC | Age: 75
End: 2020-06-09

## 2020-06-09 DIAGNOSIS — R93.2 ABNORMAL MRI, LIVER: Primary | ICD-10-CM

## 2020-06-10 NOTE — TELEPHONE ENCOUNTER
From: Nakul Nelson  To: Winter Díaz. Katrin Evans MD  Sent: 6/9/2020 3:53 PM CDT  Subject: Referral Request    Dr. Katrin Evans,  I will need a follow up 6 month MRI of my liver. It is due this month.  Can you please issue me a referral to have this done at [facility]

## 2020-07-09 ENCOUNTER — PATIENT MESSAGE (OUTPATIENT)
Dept: INTERNAL MEDICINE CLINIC | Facility: CLINIC | Age: 75
End: 2020-07-09

## 2020-07-09 DIAGNOSIS — E78.00 HYPERCHOLESTEROLEMIA: Primary | ICD-10-CM

## 2020-07-09 DIAGNOSIS — R93.2 ABNORMAL MRI, LIVER: ICD-10-CM

## 2020-07-09 NOTE — TELEPHONE ENCOUNTER
Since last blood was 11-19, with Dx. For MRI ad check cholesterol, should be OK with insurance. Due for blood for cholesterol. Can be fasting. Will see for physical 11-20.

## 2020-07-09 NOTE — TELEPHONE ENCOUNTER
From: Awilda Cardenas  To: Chela Chester. Tristen Murrieta MD  Sent: 7/9/2020 1:19 PM CDT  Subject: Non-Urgent Medical Question    Dr. Tristen Murrieta,    I am trying to arrange for an MRI of my liver.  I am told I need a CMP panel blood test first. Can I make an appointment in y

## 2020-08-04 ENCOUNTER — TELEPHONE (OUTPATIENT)
Dept: INTERNAL MEDICINE CLINIC | Facility: CLINIC | Age: 75
End: 2020-08-04

## 2020-08-04 ENCOUNTER — PATIENT MESSAGE (OUTPATIENT)
Dept: INTERNAL MEDICINE CLINIC | Facility: CLINIC | Age: 75
End: 2020-08-04

## 2020-08-04 NOTE — TELEPHONE ENCOUNTER
Kaylin from [facility] is requesting for an MRI order for the abdomen. Fax it #[phone]    Thank you.

## 2020-08-05 ENCOUNTER — APPOINTMENT (OUTPATIENT)
Dept: LAB | Facility: REFERENCE LAB | Age: 75
End: 2020-08-05
Attending: INTERNAL MEDICINE
Payer: MEDICARE

## 2020-08-05 DIAGNOSIS — R93.2 ABNORMAL MRI, LIVER: ICD-10-CM

## 2020-08-05 DIAGNOSIS — E78.00 HYPERCHOLESTEROLEMIA: ICD-10-CM

## 2020-08-05 LAB
ALBUMIN SERPL-MCNC: 3.6 G/DL (ref 3.4–5)
ALBUMIN/GLOB SERPL: 1.1 {RATIO} (ref 1–2)
ALP LIVER SERPL-CCNC: 60 U/L (ref 55–142)
ALT SERPL-CCNC: 32 U/L (ref 13–56)
ANION GAP SERPL CALC-SCNC: 6 MMOL/L (ref 0–18)
AST SERPL-CCNC: 17 U/L (ref 15–37)
BILIRUB SERPL-MCNC: 0.4 MG/DL (ref 0.1–2)
BUN BLD-MCNC: 22 MG/DL (ref 7–18)
BUN/CREAT SERPL: 29.3 (ref 10–20)
CALCIUM BLD-MCNC: 8.7 MG/DL (ref 8.5–10.1)
CHLORIDE SERPL-SCNC: 108 MMOL/L (ref 98–112)
CHOLEST SMN-MCNC: 180 MG/DL (ref ?–200)
CO2 SERPL-SCNC: 27 MMOL/L (ref 21–32)
CREAT BLD-MCNC: 0.75 MG/DL (ref 0.55–1.02)
GLOBULIN PLAS-MCNC: 3.2 G/DL (ref 2.8–4.4)
GLUCOSE BLD-MCNC: 97 MG/DL (ref 70–99)
HDLC SERPL-MCNC: 89 MG/DL (ref 40–59)
LDLC SERPL CALC-MCNC: 82 MG/DL (ref ?–100)
M PROTEIN MFR SERPL ELPH: 6.8 G/DL (ref 6.4–8.2)
NONHDLC SERPL-MCNC: 91 MG/DL (ref ?–130)
OSMOLALITY SERPL CALC.SUM OF ELEC: 295 MOSM/KG (ref 275–295)
PATIENT FASTING Y/N/NP: YES
PATIENT FASTING Y/N/NP: YES
POTASSIUM SERPL-SCNC: 4.8 MMOL/L (ref 3.5–5.1)
SODIUM SERPL-SCNC: 141 MMOL/L (ref 136–145)
TRIGL SERPL-MCNC: 47 MG/DL (ref 30–149)
VLDLC SERPL CALC-MCNC: 9 MG/DL (ref 0–30)

## 2020-08-05 PROCEDURE — 80053 COMPREHEN METABOLIC PANEL: CPT

## 2020-08-05 PROCEDURE — 36415 COLL VENOUS BLD VENIPUNCTURE: CPT

## 2020-08-05 PROCEDURE — 80061 LIPID PANEL: CPT

## 2020-08-05 NOTE — TELEPHONE ENCOUNTER
From: Awilda Cardenas  To: Chela Chester. Tristen Murrieta MD  Sent: 8/4/2020 5:09 PM CDT  Subject: Non-Urgent Medical Question    Dr. Tristen Murrieta,    I have an MRI set up for Thursday morning, August 6th.  Can I come into your office and have a blood test done tomorrow dory

## 2020-08-08 ENCOUNTER — PATIENT MESSAGE (OUTPATIENT)
Dept: INTERNAL MEDICINE CLINIC | Facility: CLINIC | Age: 75
End: 2020-08-08

## 2020-08-08 NOTE — TELEPHONE ENCOUNTER
[employer] message sent. From: Vamshi Gonzalez  To: Bascom Litten. Jamilah Rosa MD  Sent: 8/8/2020  8:50 AM CDT  Subject: Test Results Question    Have you received the results of the liver MRI I took on Thursday, August 6th? Hope you have received the results.     Chey Yi

## 2020-11-16 ENCOUNTER — OFFICE VISIT (OUTPATIENT)
Dept: INTERNAL MEDICINE CLINIC | Facility: CLINIC | Age: 75
End: 2020-11-16
Payer: MEDICARE

## 2020-11-16 VITALS
HEART RATE: 62 BPM | TEMPERATURE: 98 F | SYSTOLIC BLOOD PRESSURE: 152 MMHG | WEIGHT: 156.38 LBS | BODY MASS INDEX: 31.52 KG/M2 | HEIGHT: 59 IN | OXYGEN SATURATION: 98 % | DIASTOLIC BLOOD PRESSURE: 80 MMHG | RESPIRATION RATE: 18 BRPM

## 2020-11-16 DIAGNOSIS — R03.0 ELEVATED BP WITHOUT DIAGNOSIS OF HYPERTENSION: Primary | ICD-10-CM

## 2020-11-16 DIAGNOSIS — Z71.85 VACCINE COUNSELING: ICD-10-CM

## 2020-11-16 DIAGNOSIS — N30.00 ACUTE CYSTITIS WITHOUT HEMATURIA: ICD-10-CM

## 2020-11-16 DIAGNOSIS — E78.00 HYPERCHOLESTEROLEMIA: ICD-10-CM

## 2020-11-16 DIAGNOSIS — E61.1 LOW SERUM IRON: ICD-10-CM

## 2020-11-16 DIAGNOSIS — E55.9 VITAMIN D DEFICIENCY: ICD-10-CM

## 2020-11-16 DIAGNOSIS — Z00.00 ENCOUNTER FOR ANNUAL HEALTH EXAMINATION: ICD-10-CM

## 2020-11-16 DIAGNOSIS — L40.9 PSORIASIS AND SIMILAR DISORDER: ICD-10-CM

## 2020-11-16 DIAGNOSIS — Z12.31 VISIT FOR SCREENING MAMMOGRAM: ICD-10-CM

## 2020-11-16 DIAGNOSIS — K57.30 DIVERTICULOSIS OF COLON: ICD-10-CM

## 2020-11-16 DIAGNOSIS — M85.80 SENILE OSTEOPENIA: ICD-10-CM

## 2020-11-16 DIAGNOSIS — L21.9 SEBORRHEIC DERMATITIS: ICD-10-CM

## 2020-11-16 PROCEDURE — 3077F SYST BP >= 140 MM HG: CPT | Performed by: INTERNAL MEDICINE

## 2020-11-16 PROCEDURE — 99397 PER PM REEVAL EST PAT 65+ YR: CPT | Performed by: INTERNAL MEDICINE

## 2020-11-16 PROCEDURE — 96160 PT-FOCUSED HLTH RISK ASSMT: CPT | Performed by: INTERNAL MEDICINE

## 2020-11-16 PROCEDURE — 3008F BODY MASS INDEX DOCD: CPT | Performed by: INTERNAL MEDICINE

## 2020-11-16 PROCEDURE — 36415 COLL VENOUS BLD VENIPUNCTURE: CPT | Performed by: INTERNAL MEDICINE

## 2020-11-16 PROCEDURE — 81003 URINALYSIS AUTO W/O SCOPE: CPT | Performed by: INTERNAL MEDICINE

## 2020-11-16 PROCEDURE — 3079F DIAST BP 80-89 MM HG: CPT | Performed by: INTERNAL MEDICINE

## 2020-11-16 PROCEDURE — G0439 PPPS, SUBSEQ VISIT: HCPCS | Performed by: INTERNAL MEDICINE

## 2020-11-16 RX ORDER — CIPROFLOXACIN 500 MG/1
500 TABLET, FILM COATED ORAL 2 TIMES DAILY
Qty: 10 TABLET | Refills: 0 | Status: SHIPPED | OUTPATIENT
Start: 2020-11-16 | End: 2020-11-21

## 2020-11-16 NOTE — PROGRESS NOTES
CiraPI:    Patient ID: Vernon Leone is a 76year old female.     Vernon Leone is a 76year old female who presents for a complete physical ex • Ciprofloxacin HCl (CIPRO) 500 MG Oral Tab Take 1 tablet (500 mg total) by mouth 2 (two) times daily for 5 days.  10 tablet 0   • ATORVASTATIN 20 MG Oral Tab TAKE 1 TABLET(20 MG) BY MOUTH EVERY NIGHT 90 tablet 1   • Cholecalciferol (VITAMIN D3) 58530 units Wt Readings from Last 3 Encounters:  11/16/20 : 156 lb 6.4 oz (70.9 kg)  11/12/19 : 144 lb 3.2 oz (65.4 kg)  08/29/19 : 142 lb (64.4 kg)    BP Readings from Last 3 Encounters:  11/16/20 : 152/80  11/12/19 : 133/72  08/29/19 : 137/81    Labs:   Lab Results This is a new (Cipro always works. Last UTI was greater than a year ago.) problem. The current episode started in the past 7 days. The problem occurs every urination. The problem has been unchanged. There has been no fever. She is not sexually active.  The Neurological: Negative for dizziness, tremors, seizures, syncope, facial asymmetry, speech difficulty, weakness, light-headedness, numbness and headaches.    Psychiatric/Behavioral: Negative for agitation, behavioral problems, confusion, decreased concentra /80 (BP Location: Left arm, Patient Position: Sitting, Cuff Size: adult)   Pulse 62   Temp 97.7 °F (36.5 °C) (Oral)   Resp 18   Ht 4' 11\" (1.499 m)   Wt 156 lb 6.4 oz (70.9 kg)   SpO2 98%   BMI 31.59 kg/m²   BP Readings from Last 3 Encounters:  11/1 Eyes: Pupils are equal, round, and reactive to light. Conjunctivae, EOM and lids are normal. Right eye exhibits no chemosis, no discharge, no exudate and no hordeolum. No foreign body present in the right eye.  Left eye exhibits no chemosis, no discharge, n Abdominal: Soft. Bowel sounds are normal. She exhibits no distension, no fluid wave, no ascites and no mass. There is no hepatosplenomegaly, splenomegaly or hepatomegaly. There is no abdominal tenderness.  There is no rigidity, no rebound, no guarding and n Elevated bp without diagnosis of hypertension  (primary encounter diagnosis) ? Control. RN only visit in 1 week with bring both machines and to compare. Call in 2 weeks afterwards to give us readings on the blood pressure. Careful with diet and excercise a Preet Craven is a 76year old female who presents for a Medicare Subsequent Annual Wellness visit (Pt already had Initial Annual Wellness).     Fall/Risk Assessment   She has been screened for Falls and is low risk: Fall/Risk Scoring: (P) 1    Cognitive Mary Pang MD as PCP - General (Internal Medicine)  Mary Pang MD as PCP - [facility]    Patient Active Problem List:     Hypercholesterolemia     Senile osteopenia     Psoriasis and similar disorder     Seborrheic dermatitis     Diverticulosis o She  reports that she quit smoking about 50 years ago. Her smoking use included cigarettes. She has a 10.00 pack-year smoking history. She has never used smokeless tobacco. She reports current alcohol use. She reports that she does not use drugs.      EXAM: Right Eye Visual Acuity: Uncorrected Right Eye Chart Acuity: 20/25   Left Eye Visual Acuity: Uncorrected Left Eye Chart Acuity: 20/25   Both Eyes Visual Acuity: Uncorrected Both Eyes Chart Acuity: 20/25   Able To Tolerate Visual Acuity: Yes      Vaccinatio Return in about 3 months (around 2/16/2021), or if symptoms worsen or fail to improve. Zeke Castro.  Manuel Montero MD, 11/16/2020     General Health     In the past six months, have you lost more than 10 pounds without trying?: (P) 2 - No  Has your appetite been Pap: Every 3 yrs age 21-65 or Pap+HPV every 5 yrs age 33-67, age 72 and older at high risk There are no preventive care reminders to display for this patient.  Update Health Maintenance if applicable    Chlamydia  Annually if high risk No results found for

## 2020-11-16 NOTE — PATIENT INSTRUCTIONS
ASSESSMENT/PLAN:   Elevated bp without diagnosis of hypertension  (primary encounter diagnosis) ? Control. RN only visit in 1 week with bring both machines and to compare. Call in 2 weeks afterwards to give us readings on the blood pressure. Careful with d B/P.   [facility] SCREENING SCHEDULE   Tests on this list are recommended by your physician but may not be covered, or covered at this frequency, by your insurer. Please check with your insurance carrier before scheduling to verify coverage.    [place] abnormal Colonoscopy due on 10/23/2024 Update [facility] if applicable    Flex Sigmoidoscopy Screen  Covered every 5 years No results found for this or any previous visit. No flowsheet data found.      Fecal Occult Blood   Covered Annually No result your 65th birthday    Pneumococcal 23 (Pneumovax)  Covered Once after 72 Orders placed or performed in visit on 11/13/18   • PNEUMOCOCCAL IMM (PNEUMOVAX)    Please get once after your 65th birthday    Hepatitis B for Moderate/High Risk       No orders foun

## 2020-11-17 DIAGNOSIS — E55.9 VITAMIN D DEFICIENCY: ICD-10-CM

## 2020-11-17 RX ORDER — CHOLECALCIFEROL (VITAMIN D3) 1250 MCG
CAPSULE ORAL
Qty: 4 CAPSULE | Refills: 4 | Status: SHIPPED | OUTPATIENT
Start: 2020-11-17 | End: 2021-03-07

## 2020-11-19 RX ORDER — ATORVASTATIN CALCIUM 20 MG/1
20 TABLET, FILM COATED ORAL NIGHTLY
Qty: 90 TABLET | Refills: 1 | Status: SHIPPED | OUTPATIENT
Start: 2020-11-19 | End: 2021-01-26

## 2020-11-23 ENCOUNTER — NURSE ONLY (OUTPATIENT)
Dept: INTERNAL MEDICINE CLINIC | Facility: CLINIC | Age: 75
End: 2020-11-23
Payer: MEDICARE

## 2020-11-23 ENCOUNTER — TELEPHONE (OUTPATIENT)
Dept: INTERNAL MEDICINE CLINIC | Facility: CLINIC | Age: 75
End: 2020-11-23

## 2020-11-23 VITALS — HEART RATE: 70 BPM | DIASTOLIC BLOOD PRESSURE: 75 MMHG | SYSTOLIC BLOOD PRESSURE: 149 MMHG

## 2020-11-23 DIAGNOSIS — Z01.30 BLOOD PRESSURE CHECK: Primary | ICD-10-CM

## 2020-11-23 PROCEDURE — 3078F DIAST BP <80 MM HG: CPT | Performed by: INTERNAL MEDICINE

## 2020-11-23 PROCEDURE — 3077F SYST BP >= 140 MM HG: CPT | Performed by: INTERNAL MEDICINE

## 2020-11-23 RX ORDER — LISINOPRIL 10 MG/1
10 TABLET ORAL DAILY
Qty: 30 TABLET | Refills: 1 | Status: SHIPPED | OUTPATIENT
Start: 2020-11-23 | End: 2020-11-24

## 2020-11-23 NOTE — TELEPHONE ENCOUNTER
Blood pressures are slightly high. Would recommend lisinopril 10 mg once at night. Or once in the day. Would follow-up with a nurse visit in 10 days just to check blood pressure. Careful with diet and excercise at least 30 minutes 3-4 times a week.  Check

## 2020-11-24 RX ORDER — LISINOPRIL 10 MG/1
10 TABLET ORAL DAILY
Qty: 90 TABLET | Refills: 0 | Status: SHIPPED | OUTPATIENT
Start: 2020-11-24 | End: 2021-01-26

## 2020-11-24 NOTE — TELEPHONE ENCOUNTER
Spoke with the patient and advised her on Dr. Wilder Thapa orders and plan of care from below. Patient voiced understanding and agreed with the plan of care. Message routed to the call center to schedule a nurse visit for B/p check.

## 2020-12-01 ENCOUNTER — LAB REQUISITION (OUTPATIENT)
Dept: LAB | Facility: HOSPITAL | Age: 75
End: 2020-12-01
Payer: MEDICARE

## 2020-12-01 DIAGNOSIS — D48.5 NEOPLASM OF UNCERTAIN BEHAVIOR OF SKIN: ICD-10-CM

## 2020-12-01 PROCEDURE — 88305 TISSUE EXAM BY PATHOLOGIST: CPT | Performed by: DERMATOLOGY

## 2020-12-03 ENCOUNTER — PATIENT MESSAGE (OUTPATIENT)
Dept: INTERNAL MEDICINE CLINIC | Facility: CLINIC | Age: 75
End: 2020-12-03

## 2020-12-04 ENCOUNTER — NURSE ONLY (OUTPATIENT)
Dept: INTERNAL MEDICINE CLINIC | Facility: CLINIC | Age: 75
End: 2020-12-04
Payer: MEDICARE

## 2020-12-04 DIAGNOSIS — Z01.30 BP CHECK: Primary | ICD-10-CM

## 2020-12-04 NOTE — PROGRESS NOTES
Patient presents for BP check. Name and  verified. Patient states she did take her BP meds this morning. Patient's resting BP was 151/74  with a pulse of 67 via SpotMonitor. She also did leave her BP readings with us. Placed in your bin doctor.   BP

## 2020-12-07 NOTE — TELEPHONE ENCOUNTER
Glad that she gave her some readings different days. Blood pressures overall look good occasional mild highs but nothing persistently elevated. Will monitor for now.

## 2020-12-15 ENCOUNTER — HOSPITAL ENCOUNTER (OUTPATIENT)
Dept: BONE DENSITY | Age: 75
Discharge: HOME | End: 2020-12-15
Attending: INTERNAL MEDICINE
Payer: MEDICARE

## 2020-12-15 ENCOUNTER — HOSPITAL ENCOUNTER (OUTPATIENT)
Dept: MAMMOGRAPHY | Age: 75
Discharge: HOME | End: 2020-12-15
Attending: INTERNAL MEDICINE
Payer: MEDICARE

## 2020-12-15 DIAGNOSIS — M85.80 SENILE OSTEOPENIA: ICD-10-CM

## 2020-12-15 DIAGNOSIS — Z12.31 VISIT FOR SCREENING MAMMOGRAM: ICD-10-CM

## 2020-12-15 PROCEDURE — 77067 SCR MAMMO BI INCL CAD: CPT | Performed by: INTERNAL MEDICINE

## 2020-12-15 PROCEDURE — 77063 BREAST TOMOSYNTHESIS BI: CPT | Performed by: INTERNAL MEDICINE

## 2020-12-15 PROCEDURE — 77080 DXA BONE DENSITY AXIAL: CPT | Performed by: INTERNAL MEDICINE

## 2021-01-25 ENCOUNTER — IMMUNIZATION (OUTPATIENT)
Dept: LAB | Facility: HOSPITAL | Age: 76
End: 2021-01-25
Attending: EMERGENCY MEDICINE
Payer: MEDICARE

## 2021-01-25 DIAGNOSIS — Z23 NEED FOR VACCINATION: Primary | ICD-10-CM

## 2021-01-25 PROCEDURE — 0011A SARSCOV2 VAC 100MCG/0.5ML IM: CPT

## 2021-02-22 ENCOUNTER — IMMUNIZATION (OUTPATIENT)
Dept: LAB | Facility: HOSPITAL | Age: 76
End: 2021-02-22
Attending: PREVENTIVE MEDICINE
Payer: MEDICARE

## 2021-02-22 DIAGNOSIS — Z23 NEED FOR VACCINATION: Primary | ICD-10-CM

## 2021-02-22 PROCEDURE — 0012A SARSCOV2 VAC 100MCG/0.5ML IM: CPT

## 2021-02-25 ENCOUNTER — NURSE TRIAGE (OUTPATIENT)
Dept: INTERNAL MEDICINE CLINIC | Facility: CLINIC | Age: 76
End: 2021-02-25

## 2021-02-25 NOTE — TELEPHONE ENCOUNTER
Action Requested: Summary for Provider     []  Critical Lab, Recommendations Needed  [x] Need Additional Advice  []   FYI    []   Need Orders  [] Need Medications Sent to Pharmacy  []  Other     SUMMARY: Pt states she received her [address]ussain James

## 2021-02-26 NOTE — TELEPHONE ENCOUNTER
Spoke to patient patient states she had a rash, arm redness, and soreness, fatigue after receiving the second dose of COVID-19 vaccine. Symptoms have since resolved. Patient has appointment scheduled on 3/4/2021 with Dr. Katrin Evans.

## 2021-03-04 ENCOUNTER — OFFICE VISIT (OUTPATIENT)
Dept: INTERNAL MEDICINE CLINIC | Facility: CLINIC | Age: 76
End: 2021-03-04
Payer: MEDICARE

## 2021-03-04 VITALS
WEIGHT: 158.63 LBS | RESPIRATION RATE: 19 BRPM | OXYGEN SATURATION: 97 % | BODY MASS INDEX: 31.98 KG/M2 | HEIGHT: 59 IN | TEMPERATURE: 97 F | DIASTOLIC BLOOD PRESSURE: 73 MMHG | HEART RATE: 69 BPM | SYSTOLIC BLOOD PRESSURE: 127 MMHG

## 2021-03-04 DIAGNOSIS — Z71.85 VACCINE COUNSELING: ICD-10-CM

## 2021-03-04 DIAGNOSIS — Z12.31 VISIT FOR SCREENING MAMMOGRAM: ICD-10-CM

## 2021-03-04 DIAGNOSIS — E78.00 HYPERCHOLESTEROLEMIA: ICD-10-CM

## 2021-03-04 DIAGNOSIS — R03.0 ELEVATED BP WITHOUT DIAGNOSIS OF HYPERTENSION: Primary | ICD-10-CM

## 2021-03-04 DIAGNOSIS — R21 RASH: ICD-10-CM

## 2021-03-04 DIAGNOSIS — E55.9 VITAMIN D DEFICIENCY: ICD-10-CM

## 2021-03-04 PROCEDURE — 36415 COLL VENOUS BLD VENIPUNCTURE: CPT | Performed by: INTERNAL MEDICINE

## 2021-03-04 PROCEDURE — 99214 OFFICE O/P EST MOD 30 MIN: CPT | Performed by: INTERNAL MEDICINE

## 2021-03-04 RX ORDER — BETAMETHASONE DIPROPIONATE 0.05 %
1 OINTMENT (GRAM) TOPICAL NIGHTLY
Qty: 50 G | Refills: 1 | Status: SHIPPED | OUTPATIENT
Start: 2021-03-04

## 2021-03-04 NOTE — PATIENT INSTRUCTIONS
ASSESSMENT/PLAN:   Elevated bp without diagnosis of hypertension  (primary encounter diagnosis)Careful with diet and excercise at least 30 minutes 3-4 times a week. Check blood pressures at different times on different days.  Can purchase own blood pressure sign of shingles is often pain, burning, tingling, or itching on one part of your face or body. You may also feel as if you have the flu, with fever and chills. · A red rash with small blisters appears in a few days.  The rash may look as follows:   ? The complications during or after the infection comes out:  · Postherpetic neuralgia. This is the most common complication. It's more likely as people age, especially after age 61. It' is nerve pain at the place where the rash used to be.  It can range from mil pox.  The shingles vaccine  Two shingles vaccines are available to help prevent shingles or make it less painful:  · Zoster vaccine live (ZVL)  · Recombinant zoster vaccine (Blank Bryson). This is a newer vaccine that has been available since 2017.   You should get receiving this medicine?   Side effects that you should report to your doctor or health care professional as soon as possible:  · allergic reactions like skin rash, itching or hives, swelling of the face, lips, or tongue  · breathing problems  Side effects

## 2021-03-04 NOTE — PROGRESS NOTES
HPI:    Patient ID: Feli Guo is a 76year old female. Hypertension  Patient is here for follow up of hypertension. BP at home: same. Has been compliant with medications. Exercise level: trying to do more and has been following low salt diet. After second moderna vaccine. Had hives from chest above. Itchy. Now improved but still occasionally seems to itch. Denies any new products soaps lotions detergents i.e. nor any new foods that had eaten.   Denies shortness of breath or throat closing or Diagnosis Date   • Allergic rhinitis    • Foot fracture, left    • Ovarian ca [facility]) 1992    SIMBA/BSO   • Psoriasis       Past Surgical History:   Procedure Laterality Date   • BSO, OMENTECTOMY W/SIMBA     • HYSTERECTOMY      SIMBA/BSO      Family History   Prob Radial pulses are 2+ on the right side and 2+ on the left side. Dorsalis pedis pulses are 2+ on the right side and 2+ on the left side. Posterior tibial pulses are 2+ on the right side and 2+ on the left side.    Pulmonary/Chest: Effort n Imaging & Referrals:  None     [facility] -21 for physical.

## 2021-03-05 LAB — 25(OH)D3 SERPL-MCNC: 73.1 NG/ML (ref 30–100)

## 2021-03-06 DIAGNOSIS — E55.9 VITAMIN D DEFICIENCY: ICD-10-CM

## 2021-03-07 RX ORDER — CHOLECALCIFEROL (VITAMIN D3) 1250 MCG
CAPSULE ORAL
Qty: 12 CAPSULE | Refills: 0 | Status: SHIPPED | OUTPATIENT
Start: 2021-03-07

## 2021-07-20 RX ORDER — LISINOPRIL 10 MG/1
10 TABLET ORAL DAILY
Qty: 90 TABLET | Refills: 0 | Status: SHIPPED | OUTPATIENT
Start: 2021-07-20 | End: 2021-11-17

## 2021-07-20 RX ORDER — ATORVASTATIN CALCIUM 20 MG/1
TABLET, FILM COATED ORAL
Qty: 90 TABLET | Refills: 0 | Status: SHIPPED | OUTPATIENT
Start: 2021-07-20 | End: 2021-11-17

## 2021-08-17 ENCOUNTER — TELEPHONE (OUTPATIENT)
Dept: INTERNAL MEDICINE CLINIC | Facility: CLINIC | Age: 76
End: 2021-08-17

## 2021-08-17 ENCOUNTER — NURSE TRIAGE (OUTPATIENT)
Dept: INTERNAL MEDICINE CLINIC | Facility: CLINIC | Age: 76
End: 2021-08-17

## 2021-08-17 DIAGNOSIS — R30.0 DYSURIA: Primary | ICD-10-CM

## 2021-08-17 RX ORDER — CIPROFLOXACIN 500 MG/1
500 TABLET, FILM COATED ORAL 2 TIMES DAILY
Qty: 10 TABLET | Refills: 0 | Status: SHIPPED | OUTPATIENT
Start: 2021-08-17 | End: 2021-08-17

## 2021-08-17 RX ORDER — CIPROFLOXACIN 500 MG/1
500 TABLET, FILM COATED ORAL 2 TIMES DAILY
Qty: 10 TABLET | Refills: 0 | Status: SHIPPED | OUTPATIENT
Start: 2021-08-17 | End: 2021-08-18

## 2021-08-17 NOTE — TELEPHONE ENCOUNTER
----- Message from Marcell Hunter sent at 8/16/2021  2:23 PM CDT -----  Regarding: Other  Contact: [phone]  Dr. Jamilah Rosa,    I have the starting of a urinary infection. I have had enough of them to know the feeling and symptoms.  Can you please send a

## 2021-08-17 NOTE — TELEPHONE ENCOUNTER
Action Requested: Summary for Provider     []  Critical Lab, Recommendations Needed  [] Need Additional Advice  [x]   FYI    []   Need Orders  [] Need Medications Sent to Pharmacy  []  Other     SUMMARY: Patient complains of urinary burning that started ye

## 2021-08-18 ENCOUNTER — LAB ENCOUNTER (OUTPATIENT)
Dept: LAB | Facility: HOSPITAL | Age: 76
End: 2021-08-18
Attending: INTERNAL MEDICINE
Payer: MEDICARE

## 2021-08-18 DIAGNOSIS — R30.0 DYSURIA: ICD-10-CM

## 2021-08-18 LAB
BILIRUB UR QL: NEGATIVE
CLARITY UR: CLEAR
COLOR UR: YELLOW
GLUCOSE UR-MCNC: NEGATIVE MG/DL
HGB UR QL STRIP.AUTO: NEGATIVE
KETONES UR-MCNC: NEGATIVE MG/DL
NITRITE UR QL STRIP.AUTO: NEGATIVE
PH UR: 7 [PH] (ref 5–8)
PROT UR-MCNC: NEGATIVE MG/DL
SP GR UR STRIP: 1.01 (ref 1–1.03)
UROBILINOGEN UR STRIP-ACNC: 2

## 2021-08-18 PROCEDURE — 87088 URINE BACTERIA CULTURE: CPT

## 2021-08-18 PROCEDURE — 81001 URINALYSIS AUTO W/SCOPE: CPT

## 2021-08-18 PROCEDURE — 87086 URINE CULTURE/COLONY COUNT: CPT

## 2021-08-18 PROCEDURE — 87186 SC STD MICRODIL/AGAR DIL: CPT

## 2021-08-18 RX ORDER — CIPROFLOXACIN 500 MG/1
500 TABLET, FILM COATED ORAL 2 TIMES DAILY
Qty: 10 TABLET | Refills: 0 | Status: SHIPPED | OUTPATIENT
Start: 2021-08-18 | End: 2021-08-23

## 2021-08-18 NOTE — TELEPHONE ENCOUNTER
There is a's [facility] and there is also an [place] did you say [place] question I was told by MA to send to [facility].

## 2021-08-18 NOTE — TELEPHONE ENCOUNTER
Patient will like for you to send Cipro to [facility] because insurance doesn't cover prescriptions in [facility]. [facility] address is in the [place].

## 2021-08-18 NOTE — TELEPHONE ENCOUNTER
Can she drop off her urine at the lab. Orders will be placed. Make sure we do not have a but that is resistant. I will give her prescription for antibiotics.   But if she can wait on antibiotics until she gives us a urine for us or she can do the antibio

## 2021-10-24 ENCOUNTER — PATIENT MESSAGE (OUTPATIENT)
Dept: INTERNAL MEDICINE CLINIC | Facility: CLINIC | Age: 76
End: 2021-10-24

## 2021-10-24 DIAGNOSIS — U07.1 COVID: Primary | ICD-10-CM

## 2021-10-25 ENCOUNTER — NURSE ONLY (OUTPATIENT)
Dept: INFUSION CENTER | Age: 76
End: 2021-10-25
Attending: INTERNAL MEDICINE
Payer: MEDICARE

## 2021-10-25 ENCOUNTER — TELEPHONE (OUTPATIENT)
Dept: CASE MANAGEMENT | Age: 76
End: 2021-10-25

## 2021-10-25 VITALS
HEART RATE: 67 BPM | OXYGEN SATURATION: 100 % | TEMPERATURE: 98 F | RESPIRATION RATE: 20 BRPM | BODY MASS INDEX: 32 KG/M2 | WEIGHT: 158 LBS | SYSTOLIC BLOOD PRESSURE: 133 MMHG | DIASTOLIC BLOOD PRESSURE: 72 MMHG

## 2021-10-25 NOTE — TELEPHONE ENCOUNTER
Pt received PAB infusion at [facility] on 10-25-21 for COVID-19. Please follow-up with pt for post-infusion assessment and home monitoring if needed. Thank you.

## 2021-10-25 NOTE — PROGRESS NOTES
1036-Patient arrived for monoclonal infusion. She did not have any questions. 1045-24G angiocatheter inserted into patients left upper forearm. Placement checked. Patient tolerated well. 1056-Infusion started. Patient resting comfortably on the cart.

## 2021-10-25 NOTE — TELEPHONE ENCOUNTER
Future Appointments   Date Time Provider Deonte Braun   10/25/2021 10:45 AM HND MONOCLONAL TWO HND MONO INF [facility] [place]

## 2021-10-25 NOTE — TELEPHONE ENCOUNTER
From: Tashia Lugo  To: Vandana Ely MD  Sent: 10/24/2021 11:19 AM CDT  Subject: Covid-19    Dr. Lalo Ely,    I took a covid test today (10/24) which came back positive.  I started coughing slightly last Thursday and was sick all day Friday and Saturd

## 2021-10-25 NOTE — TELEPHONE ENCOUNTER
Wearing a mask and self quarentine Away from family. Check pulse ox. If SOB more or < 90% ER. Monoclonal AB infusion?

## 2021-10-26 NOTE — TELEPHONE ENCOUNTER
What  was your temp today? -    98.2    How did you take your temp?   forehead    What was your pulse ox today? N/A    Are you feeling short of breath today? No      Is the shortness of breath better, the same, or worse than yesterday?    Aarti Fraga

## 2021-11-17 RX ORDER — LISINOPRIL 10 MG/1
10 TABLET ORAL DAILY
Qty: 60 TABLET | Refills: 0 | Status: SHIPPED | OUTPATIENT
Start: 2021-11-17 | End: 2022-01-13

## 2021-11-17 RX ORDER — ATORVASTATIN CALCIUM 20 MG/1
20 TABLET, FILM COATED ORAL NIGHTLY
Qty: 60 TABLET | Refills: 0 | Status: SHIPPED | OUTPATIENT
Start: 2021-11-17 | End: 2022-02-01

## 2021-11-17 NOTE — TELEPHONE ENCOUNTER
Please review; protocol failed.  LOV 3/4/21 No labs since 11/16/20    Scheduled 11/18/21 for medicare physical

## 2021-11-18 ENCOUNTER — OFFICE VISIT (OUTPATIENT)
Dept: INTERNAL MEDICINE CLINIC | Facility: CLINIC | Age: 76
End: 2021-11-18
Payer: MEDICARE

## 2021-11-18 VITALS
BODY MASS INDEX: 33.19 KG/M2 | TEMPERATURE: 97 F | RESPIRATION RATE: 18 BRPM | WEIGHT: 164.63 LBS | HEIGHT: 59 IN | DIASTOLIC BLOOD PRESSURE: 75 MMHG | SYSTOLIC BLOOD PRESSURE: 116 MMHG | OXYGEN SATURATION: 98 % | HEART RATE: 65 BPM

## 2021-11-18 DIAGNOSIS — E61.1 LOW SERUM IRON: ICD-10-CM

## 2021-11-18 DIAGNOSIS — R03.0 ELEVATED BP WITHOUT DIAGNOSIS OF HYPERTENSION: ICD-10-CM

## 2021-11-18 DIAGNOSIS — L40.9 PSORIASIS AND SIMILAR DISORDER: ICD-10-CM

## 2021-11-18 DIAGNOSIS — Z00.00 ENCOUNTER FOR ANNUAL HEALTH EXAMINATION: ICD-10-CM

## 2021-11-18 DIAGNOSIS — Z71.85 VACCINE COUNSELING: ICD-10-CM

## 2021-11-18 DIAGNOSIS — M85.80 SENILE OSTEOPENIA: ICD-10-CM

## 2021-11-18 DIAGNOSIS — E78.00 HYPERCHOLESTEROLEMIA: Primary | ICD-10-CM

## 2021-11-18 DIAGNOSIS — Z12.31 VISIT FOR SCREENING MAMMOGRAM: ICD-10-CM

## 2021-11-18 DIAGNOSIS — D64.9 ANEMIA, UNSPECIFIED TYPE: ICD-10-CM

## 2021-11-18 DIAGNOSIS — K57.30 DIVERTICULOSIS OF COLON: ICD-10-CM

## 2021-11-18 DIAGNOSIS — E55.9 VITAMIN D DEFICIENCY: ICD-10-CM

## 2021-11-18 DIAGNOSIS — L21.9 SEBORRHEIC DERMATITIS: ICD-10-CM

## 2021-11-18 PROCEDURE — 81003 URINALYSIS AUTO W/O SCOPE: CPT | Performed by: INTERNAL MEDICINE

## 2021-11-18 PROCEDURE — G0439 PPPS, SUBSEQ VISIT: HCPCS | Performed by: INTERNAL MEDICINE

## 2021-11-18 PROCEDURE — 36415 COLL VENOUS BLD VENIPUNCTURE: CPT | Performed by: INTERNAL MEDICINE

## 2021-11-18 PROCEDURE — 99497 ADVNCD CARE PLAN 30 MIN: CPT | Performed by: INTERNAL MEDICINE

## 2021-11-18 NOTE — PATIENT INSTRUCTIONS
PLAN SUMMARY:   Diagnoses and all orders for this visit:    Hypercholesterolemia  -     LIPID PANEL; Future  -     COMP METABOLIC PANEL (14);  Future    Senile osteopenia  -     VITAMIN D; Future  -     TSH W REFLEX TO FREE T4; Future    Psoriasis and simil rice cooked  Macaroni  1.0/1 cup (cooked)  Rice, brown  2.4/1 cup (cooked)  Rice, polished  0.6/1 cup (cooked)  Spaghetti (regular) 1.0/1 cup (cooked)    Flours and grains  Bran, oat  8.3/oz  Bran, wheat  12.4/oz  Rolled oats  13.7/1 cup (cooked)    Nuts and chills.  Symptoms of diverticulosis and diverticulitis are similar to other conditions, such as appendicitis, ovarian cyst, peptic ulcer, Crohn’s disease, and irritable bowel syndrome – so the doctor may do tests such as x-rays, ultrasound, or endoscopy colon. The best way to prevent diverticulitis is to keep diverticulosis under control. That means eating a high fiber diet – which requires 20 to 35 grams of fiber each day. Fiber is found in grains, vegetables, and fruits.   Also, it is important to drink W/O SCOPE  -     ADVANCE CARE PLANNING FIRST 30 MINS    Low serum iron  -     CBC WITH DIFFERENTIAL WITH PLATELET; Future  -     VITAMIN B12; Future  -     IRON AND TIBC;  Future  -     FERRITIN; Future    Anemia, unspecified type   -     TSH W REFLEX TO FR form a crust. The crust falls off in days to weeks. The blisters generally don't leave scars. But they can in severe cases. Or if someone has a weak immune system. How is shingles treated?   For most people, shingles heals on its own in a few days or wee treat the infection. · Eye problems. If you have shingles on the face, see your healthcare provider right away. Shingles can cause serious problems with vision, and even blindness.   In very rare cases, shingles can also lead to pneumonia, hearing problems vaccine works better and protects you from shingles longer. Talk with your healthcare provider about the best time to get vaccinated. Ask which vaccine is best for you based on your age and health conditions.   Santo last reviewed this educational alan appointments for follow-up (booster) doses as directed. It is important not to miss your dose. Call your doctor or health care professional if you are unable to keep an appointment. Where should I keep my medicine?   This vaccine is only given in a clinic, Date    CHOLEST 168 11/16/2020    HDL 78 (H) 11/16/2020    LDL 66 11/16/2020    TRIG 121 11/16/2020         Electrocardiogram (EKG)   Covered if needed at Welcome to Medicare, and non-screening if indicated for medical reasons -      Ultrasound Screening f covered by Medicare Part B unless medically necessary (cut with metal); may be covered with your pharmacy prescription benefits 01/01/1998    Tetanus, Diptheria and Pertusis TD and TDaP Not covered by Medicare Part B -  No recommendations at this time    Z

## 2021-11-18 NOTE — PROGRESS NOTES
HPI:    Patient ID: Kenneth Barnard is a 68year old female. Kenneth Barnard is a 68year old female who presents for a complete physical exam.   HPI:   Patient presents with:  Wellness Visit: medicare annual     No LMP recorded.  Patient has had a hyste UT) Oral Cap Take 1 capsule by mouth once a week 12 capsule 0   • Betamethasone Dipropionate 0.05 % External Ointment Apply 1 g topically nightly.  50 g 1      Past Medical History:   Diagnosis Date   • Allergic rhinitis    • Foot fracture, left    • Ovaria CA 9.7 11/16/2020 02:49 PM    AST 19 11/16/2020 02:49 PM    ALT 30 11/16/2020 02:49 PM        Lab Results   Component Value Date/Time    CHOLEST 168 11/16/2020 02:49 PM    HDL 78 (H) 11/16/2020 02:49 PM    TRIG 121 11/16/2020 02:49 PM    LDL 66 11/16/20 and suicidal ideas. The patient is not nervous/anxious and is not hyperactive. All other systems reviewed and are negative.         Current Outpatient Medications   Medication Sig Dispense Refill   • atorvastatin 20 MG Oral Tab Take 1 tablet (20 mg total 127/73    Wt Readings from Last 3 Encounters:  11/18/21 : 164 lb 9.6 oz (74.7 kg)  10/25/21 : 158 lb (71.7 kg)  03/04/21 : 158 lb 9.6 oz (71.9 kg)      Physical Exam  Vitals and nursing note reviewed.    Constitutional:       General: She is not in acute [facility] Conjunctiva/sclera: Conjunctivae normal.      Right eye: Right conjunctiva is not injected. No chemosis, exudate or hemorrhage. Left eye: Left conjunctiva is not injected. No chemosis, exudate or hemorrhage.      Pupils: Pupils are equal, round, and fina hepatomegaly, splenomegaly or mass. Tenderness: There is no abdominal tenderness. There is no right CVA tenderness, left CVA tenderness, guarding or rebound. Genitourinary:     Exam position: Supine.    Musculoskeletal:      Cervical back: Neck suppl Planning- 1st 30 minutes      Meds This Visit:  Requested Prescriptions      No prescriptions requested or ordered in this encounter       Imaging & Referrals:  [facility] CHUCK 2D+3D SCREENING BILAT (CPT=77067/10602)        HPI:   Billy Briscoe is a 68year old CAGE screening score of 0 on 11/12/2021, showing low risk of alcohol abuse.         Patient Care Team: Patient Care Team:  Mary Pang MD as PCP - General (Internal Medicine)    Patient Active Problem List:     Hypercholesterolemia     Senile o Hypertension in her father; Ovarian Cancer (age of onset: 52) in her self. SOCIAL HISTORY:   She  reports that she quit smoking about 51 years ago. Her smoking use included cigarettes. She has a 10.00 pack-year smoking history.  She has never used smokele may have hearing loss:  No               Visual Acuity  Right Eye Visual Acuity: Corrected Right Eye Chart Acuity: 20/20   Left Eye Visual Acuity: Corrected Left Eye Chart Acuity: 20/20   Both Eyes Visual Acuity: Corrected Both Eyes Chart Acuity: 20/20   Ab sprouts  Cabbage  2.9/1 cup (cooked)  Carrots   4.6/1 cup  Cauliflower  2.1/1 cup  Peas   7. 2/1 cup (cooked)  Potato (with skin) 2.3/1 boiled  Spinach  4.1/1 cup (raw)  Squash, summer 3.4/1 cup (cooked, diced)  Sweet potatoes  2.7/1 baked  Zucchini  4. 2/1 of cases. Researchers think a diet low in fiber is to blame for a high incidence of diverticulosis. Fiber is important because it helps keep stool soft and bulky so it can pass easily through the colon.  Without enough fiber stool becomes hard, which creat be on a low fiber diet. After the infection is gone, the doctor will want you to switch to a high fiber diet.  The new diet will not get rid of existing pouches in the colon, but the bulk from the extra fiber will help the stool move  through your system be people affected by gastrointestinal disorders. For  more information, or permission to reprint this article, write to [employer] [employer] Box  [id number], Gurdeep Griffith. Call toll-free (in the [address] [place] [address].) [phone]  or [phone]. Visit our websites at: www. if state?: Good  How do you maintain positive mental well-being?: Social Interaction;Games; Visiting Friends; Visiting Family     Supplementary Documentation:   [facility] SCREENING SCHEDULE   Tests on this list are recommended by your physician but may deficiency.     Covered yearly for long-term glucocorticoid medication use (Steroids) Last Dexa Scan:    XR DEXA BONE DENSITOMETRY (CPT=77080) 12/15/2020      No recommendations at this time   Pap and Pelvic    Pap   Covered every 2 years for women at [facility] does have a POA but we do NOT have it on file in [facility] [address]             Advance care planning including the explanation and discussion of advance directives standard forms performed Face to Face with patient and Family/surrogate (if present), and forms available

## 2021-11-19 ENCOUNTER — PATIENT MESSAGE (OUTPATIENT)
Dept: INTERNAL MEDICINE CLINIC | Facility: CLINIC | Age: 76
End: 2021-11-19

## 2021-11-19 NOTE — TELEPHONE ENCOUNTER
From: Vernon Leone  To: Zeke Castro. Manuel Montero MD  Sent: 11/19/2021 7:58 AM CST  Subject: Topamax    What is Topamax and is it available over counter?

## 2021-11-21 RX ORDER — TOPIRAMATE 25 MG/1
25 CAPSULE, COATED PELLETS ORAL 2 TIMES DAILY
Qty: 60 CAPSULE | Refills: 1 | Status: SHIPPED | OUTPATIENT
Start: 2021-11-21

## 2021-11-22 NOTE — TELEPHONE ENCOUNTER
Prescription was written for Topamax once a day for 2 weeks if it feels like it is helping with help with weight loss then can increase to twice a day if he feels like is helping and does not feel like needs more than keep with the once a day will just hav

## 2021-12-21 ENCOUNTER — HOSPITAL ENCOUNTER (OUTPATIENT)
Dept: MAMMOGRAPHY | Age: 76
Discharge: HOME | End: 2021-12-21
Attending: INTERNAL MEDICINE
Payer: MEDICARE

## 2021-12-21 DIAGNOSIS — Z12.31 VISIT FOR SCREENING MAMMOGRAM: ICD-10-CM

## 2021-12-21 PROCEDURE — 77063 BREAST TOMOSYNTHESIS BI: CPT | Performed by: INTERNAL MEDICINE

## 2021-12-21 PROCEDURE — 77067 SCR MAMMO BI INCL CAD: CPT | Performed by: INTERNAL MEDICINE

## 2022-01-13 RX ORDER — LISINOPRIL 10 MG/1
10 TABLET ORAL DAILY
Qty: 90 TABLET | Refills: 1 | Status: SHIPPED | OUTPATIENT
Start: 2022-01-13

## 2022-01-13 NOTE — TELEPHONE ENCOUNTER
Refill passed per [facility] protocol.   Requested Prescriptions   Pending Prescriptions Disp Refills    LISINOPRIL 10 MG Oral Tab [Pharmacy Med Name: Lisinopril 10 Mg Tab Solc] 60 tablet 0     Sig: Take 1 tablet (10 mg total) by mouth daily        Hyp

## 2022-02-01 RX ORDER — ATORVASTATIN CALCIUM 20 MG/1
20 TABLET, FILM COATED ORAL NIGHTLY
Qty: 90 TABLET | Refills: 1 | Status: SHIPPED | OUTPATIENT
Start: 2022-02-01 | End: 2022-07-27

## 2022-02-01 NOTE — TELEPHONE ENCOUNTER
Refill passed per [place] [facility] protocol. Requested Prescriptions   Pending Prescriptions Disp Refills    ATORVASTATIN 20 MG Oral Tab [Pharmacy Med Name: Atorvastatin Calcium 20 Mg Tab Nort] 60 tablet 0     Sig: Take 1 tablet (20 mg total) by mouth nightly.         Cholesterol Medication Protocol Passed - 2/1/2022 10:42 AM        Passed - ALT in past 12 months        Passed - LDL in past 12 months        Passed - Last ALT < 80       Lab Results   Component Value Date    ALT 33 11/18/2021             Passed - Last LDL < 130     Lab Results   Component Value Date    LDL 86 11/18/2021               Passed - Appointment in past 12 or next 3 months             Recent Outpatient Visits              2 months ago Hypercholesterolemia    [facility], [address], Vahe Razo MD    Office Visit    3 months ago     [facility] (Antibody Infusion)    Nurse Only    11 months ago Elevated BP without diagnosis of hypertension    [facility], [address], Vahe Razo MD    Office Visit    1 year ago BP check    [facility], [address], [address] Only    1 year ago Blood pressure check    [facility], [address], [address] Only           Future Appointments         Provider Department Appt Notes    In 3 months Javier Lynn MD [facility], [address], [address]

## 2022-02-21 NOTE — PROGRESS NOTES
HPI:    Patient ID: Prakash Meyers is a 76year old female. HPI  Has been compliant with medications. Exercise level: trying to do more and has been following low salt diet. Weight has been down. Low carb. Diet. Veggie. No potatoes.    Wt Readings fro Please call [phone] to schedule for Genetic testing.   External Ointment  Disp:  Rfl:      Allergies:  Amoxicillin                 Comment:Other reaction(s): Hives/Skin Rash  Nitrofurantoin Macr*        Comment:Other reaction(s): NITROFURANTOIN MACROCRYSTAL    HISTORY:  Past Medical History:   Diagnosis Date apnea, no tachypnea and no bradypnea. She is not intubated. No respiratory distress. She has no decreased breath sounds. She has no wheezes. She has no rhonchi. She has no rales. She exhibits no tenderness. Abdominal: Soft. There is no tenderness.    Musc

## 2022-03-17 ENCOUNTER — TELEPHONE (OUTPATIENT)
Dept: INTERNAL MEDICINE CLINIC | Facility: CLINIC | Age: 77
End: 2022-03-17

## 2022-03-17 ENCOUNTER — PATIENT MESSAGE (OUTPATIENT)
Dept: INTERNAL MEDICINE CLINIC | Facility: CLINIC | Age: 77
End: 2022-03-17

## 2022-03-17 NOTE — TELEPHONE ENCOUNTER
From: Gabby Brock  To: Debbie Stewart. Florencio Montes De Oca MD  Sent: 3/17/2022 11:04 AM CDT  Subject: Shingles vaccination    I received my 2nd dose of Shingrix at my local [facility] on Wednesday, March 16, 2022. Can you please have someone update the record on [employer]? Thank you. Gabrielle Patterson

## 2022-03-20 ENCOUNTER — PATIENT MESSAGE (OUTPATIENT)
Dept: INTERNAL MEDICINE CLINIC | Facility: CLINIC | Age: 77
End: 2022-03-20

## 2022-03-21 NOTE — TELEPHONE ENCOUNTER
From: Joan Severe  To: Yung Granda. Marvin Abreu MD  Sent: 3/20/2022 12:20 PM CDT  Subject: Side effcts to Shingles    Dr. Marvin Abreu,    Received my second shingles shot and the next day I broke out in a terrible rash as I did with the [place] second dose. I also had a swollen arm, high fever, headache and muscle ache. The rash is red, warm and itchy in my scalp and top half of my torso. I couldn't think of anything but taking some benadryl to lessen the itching. I am now on my third day and it is dissipating but very slowly. I am sure it will go away but have decided that any necessary shot requiring more than one dose isn't going to happen in my body. I will pass.     Esthela Mcwilliams non-smoker lives with wife

## 2022-04-08 ENCOUNTER — PATIENT MESSAGE (OUTPATIENT)
Dept: INTERNAL MEDICINE CLINIC | Facility: CLINIC | Age: 77
End: 2022-04-08

## 2022-04-08 ENCOUNTER — LAB ENCOUNTER (OUTPATIENT)
Dept: LAB | Age: 77
End: 2022-04-08
Attending: INTERNAL MEDICINE
Payer: MEDICARE

## 2022-04-08 DIAGNOSIS — R53.83 FATIGUE, UNSPECIFIED TYPE: ICD-10-CM

## 2022-04-08 NOTE — TELEPHONE ENCOUNTER
Please inform patient that orders were written. She is also due for blood work to check her iron levels which may explain the fatigue but not the fevers. Orders in the system. Does she feel like she needs to go to urgent care?

## 2022-04-08 NOTE — TELEPHONE ENCOUNTER
From: Kris Olmedo  To: Meenakshi Rahman MD  Sent: 4/8/2022 9:21 AM CDT  Subject: covid test    Dr. Jes Rahman,    Can I get an order to have a covid test? I am fatigued, low grade temperature and the rash I have on my upper torso feels like a bad sunburn. I took a home test and it came up negative. If I don't have covid, I am at a lost why I am feeling poorly. Thank you. Clarissa Card

## 2022-04-09 LAB — SARS-COV-2 RNA RESP QL NAA+PROBE: NOT DETECTED

## 2022-04-12 ENCOUNTER — TELEPHONE (OUTPATIENT)
Dept: INTERNAL MEDICINE CLINIC | Facility: CLINIC | Age: 77
End: 2022-04-12

## 2022-04-13 NOTE — TELEPHONE ENCOUNTER
Regarding: covid test  ----- Message from Álvaro Hayden. Juancho Arshad MD sent at 4/12/2022  4:53 PM CDT -----       ----- Message sent from Lit Alvarez RN to Kerry Anton at 4/8/2022  5:32 PM -----   Dr. Juancho Trivedi has placed your order for your Covid test, please call [phone] to schedule an appointment, she would also like you to complete your blood work that she has placed. If your symptoms worsen please be advise to proceed to the Urgent Care for any further work-up. Thank you,  Laila Dominguez RN      ----- Message -----       From:Marisela Germain       Sent:4/8/2022  9:21 AM CDT         To:Maya Arshad MD    Subject:covid test    Dr. Juancho Arshad,    Can I get an order to have a covid test? I am fatigued, low grade temperature and the rash I have on my upper torso feels like a bad sunburn. I took a home test and it came up negative. If I don't have covid, I am at a lost why I am feeling poorly. Thank you. Janet Velasquez

## 2022-04-15 ENCOUNTER — LAB ENCOUNTER (OUTPATIENT)
Dept: LAB | Facility: HOSPITAL | Age: 77
End: 2022-04-15
Attending: INTERNAL MEDICINE
Payer: MEDICARE

## 2022-04-15 ENCOUNTER — TELEPHONE (OUTPATIENT)
Dept: INTERNAL MEDICINE CLINIC | Facility: CLINIC | Age: 77
End: 2022-04-15

## 2022-04-15 DIAGNOSIS — E61.1 LOW SERUM IRON: ICD-10-CM

## 2022-04-15 DIAGNOSIS — D64.9 ANEMIA, UNSPECIFIED TYPE: ICD-10-CM

## 2022-04-15 PROBLEM — D50.0 IRON DEFICIENCY ANEMIA DUE TO CHRONIC BLOOD LOSS: Status: ACTIVE | Noted: 2022-04-15

## 2022-04-15 LAB
BASOPHILS # BLD AUTO: 0.07 X10(3) UL (ref 0–0.2)
BASOPHILS NFR BLD AUTO: 1.2 %
DEPRECATED HBV CORE AB SER IA-ACNC: 13 NG/ML
DEPRECATED RDW RBC AUTO: 44 FL (ref 35.1–46.3)
EOSINOPHIL # BLD AUTO: 0.25 X10(3) UL (ref 0–0.7)
EOSINOPHIL NFR BLD AUTO: 4.3 %
ERYTHROCYTE [DISTWIDTH] IN BLOOD BY AUTOMATED COUNT: 13.6 % (ref 11–15)
HCT VFR BLD AUTO: 37 %
HGB BLD-MCNC: 11.9 G/DL
IMM GRANULOCYTES # BLD AUTO: 0.02 X10(3) UL (ref 0–1)
IMM GRANULOCYTES NFR BLD: 0.3 %
IRON SATN MFR SERPL: 10 %
IRON SERPL-MCNC: 39 UG/DL
LYMPHOCYTES # BLD AUTO: 1.49 X10(3) UL (ref 1–4)
LYMPHOCYTES NFR BLD AUTO: 25.4 %
MCH RBC QN AUTO: 28.5 PG (ref 26–34)
MCHC RBC AUTO-ENTMCNC: 32.2 G/DL (ref 31–37)
MCV RBC AUTO: 88.5 FL
MONOCYTES # BLD AUTO: 0.62 X10(3) UL (ref 0.1–1)
MONOCYTES NFR BLD AUTO: 10.6 %
NEUTROPHILS # BLD AUTO: 3.42 X10 (3) UL (ref 1.5–7.7)
NEUTROPHILS # BLD AUTO: 3.42 X10(3) UL (ref 1.5–7.7)
NEUTROPHILS NFR BLD AUTO: 58.2 %
PLATELET # BLD AUTO: 317 10(3)UL (ref 150–450)
RBC # BLD AUTO: 4.18 X10(6)UL
TIBC SERPL-MCNC: 407 UG/DL (ref 240–450)
TRANSFERRIN SERPL-MCNC: 273 MG/DL (ref 200–360)
WBC # BLD AUTO: 5.9 X10(3) UL (ref 4–11)

## 2022-04-15 PROCEDURE — 85025 COMPLETE CBC W/AUTO DIFF WBC: CPT

## 2022-04-15 PROCEDURE — 83540 ASSAY OF IRON: CPT

## 2022-04-15 PROCEDURE — 82728 ASSAY OF FERRITIN: CPT

## 2022-04-15 PROCEDURE — 36415 COLL VENOUS BLD VENIPUNCTURE: CPT

## 2022-04-15 PROCEDURE — 84466 ASSAY OF TRANSFERRIN: CPT

## 2022-04-15 NOTE — TELEPHONE ENCOUNTER
See results of CBC on patient. Would recommend repeat CBC in 3 months. Would recommend iron 65 twice a day with vitamin C 500 IUs twice a day and folic acid a milligram a day. Was all available over-the-counter. Orders written for future labs in 3 months.

## 2022-04-15 NOTE — PROGRESS NOTES
CBC Normal (white blood cells and platelets), but anemia is present. Iron levels show improvement but still anemic. Had colonoscopy in 2014. Pathology and second is anemia. Would recommend following up with Dr. Mary Ellen Cárdenas who she had seen in the past may need endoscopy.

## 2022-04-16 NOTE — TELEPHONE ENCOUNTER
Second Call attempt. Left Voicemail to call back our office. Office phone number provided with office hours.

## 2022-04-16 NOTE — TELEPHONE ENCOUNTER
Patient identity confirmed by name and date of birth. Notified of results and recommendations as noted by provider. Verbalizes understanding and agrees with plan.

## 2022-04-18 ENCOUNTER — TELEPHONE (OUTPATIENT)
Dept: INTERNAL MEDICINE CLINIC | Facility: CLINIC | Age: 77
End: 2022-04-18

## 2022-04-18 NOTE — TELEPHONE ENCOUNTER
----- Message from Marcell Tangdavie Kita sent at 4/18/2022 11:34 AM CDT -----  Regarding: Recent blood test  Dr. Curt Ryder,    I spoke with a nurse on Friday and she gave me a lot of information. Some of it was the name of a doctor that you asked I call. Unfortunately, I must have been daydreaming as I listened and didn't take down any of the information. Can you please give me the name of the doctor and his/her contact information? Thank you. Welton Row Welton Row Welton Row Orrie Cranker

## 2022-04-28 ENCOUNTER — PATIENT MESSAGE (OUTPATIENT)
Dept: INTERNAL MEDICINE CLINIC | Facility: CLINIC | Age: 77
End: 2022-04-28

## 2022-04-28 NOTE — TELEPHONE ENCOUNTER
From: Diane Jewell  To: Cherylene Mau. Mariposa Early MD  Sent: 4/28/2022 1:14 PM CDT  Subject: Dr. Cordell Early,    The earliest appointment I could get with Dr. Cordell Conklin was August 29th. Every 3 months I donate blood. My iron count always exceeds what they need to take my blood. I don't understand why with the hospital blood test I am low and at the blood donation I am normal. I have to admit I am always tired. Maybe since I had covid but I don't know. Have I ever had a thyroid test? Do you think I should have one. Really sorry to bother you with all these questions. Many thanks. Mikala Rojas

## 2022-06-20 ENCOUNTER — PATIENT MESSAGE (OUTPATIENT)
Dept: INTERNAL MEDICINE CLINIC | Facility: CLINIC | Age: 77
End: 2022-06-20

## 2022-06-20 NOTE — TELEPHONE ENCOUNTER
From: Del Glynn  To: Bryon Soares MD  Sent: 6/20/2022 2:09 PM CDT  Subject: Dr. Kal Soares,    I have made an appointment for September to see Dr. Kal Cárdenas regarding the ongoing anemia question. Do I have to tell him anything or will he know why I am coming to see him?     Thank you, Malena Magallanes

## 2022-06-22 NOTE — TELEPHONE ENCOUNTER
Outpatient Wound Clinician Visit Note    Chief Complaint:   Chief Complaint   Patient presents with   • Wound     Follow up with Dr. Gonzalez       Home Care Service:  No    Type of Supervision: Patient seen by provider    Was care transitioned to this department today? No   Was care transitioned from this department today?  No   Hospitalization within the last 30 days (if yes, date of discharge)? No     Arrival Disposition: Ambulates  Transfer Assist: None  Special Needs: Special Needs List: None    Comments:  Patient arrival information, vital signs and dressing removed by staff member noted.  Patient and Caregiver education was given regarding procedures/therapy planned.  Weight was obtained in clinic.    Additional POCT Services Provided: Lower extremity assessment      Assessment:  Wound Toe, 3rd Right (Active)   Date First Assessed: 05/18/22   Location: Toe, 3rd  Laterality: Right  Level of Skin Injury: Full Thickness  Wound Approximate Age at First Assessment (Weeks): 3 weeks      Assessments 6/22/2022  2:00 PM   Wound Image     Dressing Assessment Intact;Drainage present   Dressing Activity Changed   Dressing Changed On   06/22/22   Wound Exudate None   Cleansing Agent Normal saline;Hypochlorous acid (e.g. Vashe, Exsept)   Wound Bed/Tissue Type Necrotic tissue, eschar   Periwound Condition Hyperpigmented   Wound Edge Attached to wound bed   Wound Status Improving   Topical Agent Collagen;Hydrogel (Ling AG)   Wound Dressing Other (comment) (Bandaid)   Wound Last Measured 06/22/22   Wound Length (cm) 0.5 cm   Wound Width (cm) 0.5 cm   Wound Depth (cm) 0.1 cm   Wound Surface Area (cm^2) 0.25 cm^2   Wound Volume (cm^3) 0.025 cm^3   PhotoTaken? Yes   Time Out 1430   Debridement Percentage (%) 100   Post-Procedure Length (cm) 0.5 cm   Post-Procedure Width (cm) 0.5 cm   Post-Procedure Depth (cm) 0.1 cm   Post-Procedure Surface Area (cm^2) 0.25 cm^2   Post-Procedure Volume (cm^3) 0.025 cm^3   Wound Volume Change  Patient states she is feeling \"tip top\" so much better. Patient states rash is all gone, all symptoms are gone. Patient thanked nurse for the call. (Initial) -0.09 cm3   Wound Volume % Change (Initial) -78.63 %   Wound Volume Change (30 days) -0.06 cm3   Wound Volume % Change (30 days) -70.24 %   Post-Procedure Volume Change (Initial) -0.09 cm3   Post-Procedure Volume % Change (Initial) -78.63 %   Post-Procedure Volume Change (30 days) -0.06 cm3   Post-Procedure Volume % Change (30 days) -70.24 %       Wound Toe, 2nd Left (Active)   Date First Assessed: 05/18/22   Location: Toe, 2nd  Laterality: Left  Level of Skin Injury: Full Thickness  Wound Approximate Age at First Assessment (Weeks): 3 weeks      Assessments 6/22/2022  2:00 PM   Wound Image      Dressing Assessment Dry;Intact   Dressing Activity Changed   Dressing Changed On   06/22/22   Wound Exudate None   Cleansing Agent Normal saline   Wound Bed/Tissue Type Necrotic tissue, eschar   Periwound Condition Hyperpigmented   Wound Edge Attached to wound bed   Wound Status Improving   Topical Agent Collagen;Hydrogel (Ling AG)   Wound Dressing Other (comment) (Bandaid)   Wound Last Measured 06/22/22   Wound Length (cm) 1 cm   Wound Width (cm) 0.8 cm   Wound Depth (cm) 0.1 cm   Wound Surface Area (cm^2) 0.8 cm^2   Wound Volume (cm^3) 0.08 cm^3   PhotoTaken? Yes   Time Out 1430   Debridement Percentage (%) 100   Post-Procedure Length (cm) 0.8 cm   Post-Procedure Width (cm) 0.5 cm   Post-Procedure Depth (cm) 0.1 cm   Post-Procedure Surface Area (cm^2) 0.4 cm^2   Post-Procedure Volume (cm^3) 0.04 cm^3   Wound Bed % Necrotic Tissue Eschar 100 %   Wound Volume Change (Initial) -0.3 cm3   Wound Volume % Change (Initial) -78.95 %   Wound Volume Change (30 days) -0.3 cm3   Wound Volume % Change (30 days) -78.95 %   Post-Procedure Volume Change (Initial) -0.34 cm3   Post-Procedure Volume % Change (Initial) -89.47 %   Post-Procedure Volume Change (30 days) -0.34 cm3   Post-Procedure Volume % Change (30 days) -89.47 %       Wound Toe, 4th Left Medial (Active)   Date First Assessed: 05/18/22   Present on Hospital  Admission: Yes  Location: Toe, 4th  Laterality: Left  Modifier: Medial  Level of Skin Injury: Full Thickness  Wound Approximate Age at First Assessment (Weeks): 3 weeks      Assessments 6/22/2022  2:00 PM   Wound Image      Dressing Assessment Dry;Intact   Dressing Activity Changed   Dressing Changed On   06/22/22   Wound Exudate None   Cleansing Agent Normal saline;Hypochlorous acid (e.g. Vashe, Exsept)   Wound Bed/Tissue Type Necrotic tissue, eschar   Periwound Condition Normal   Wound Edge Attached to wound bed   Wound Status Improving   Topical Agent Collagen;Hydrogel (Ling AG)   Wound Dressing Other (comment) (Bandaid)   Wound Last Measured 06/22/22   Wound Length (cm) 0.7 cm   Wound Width (cm) 0.7 cm   Wound Depth (cm) 0.1 cm   Wound Surface Area (cm^2) 0.49 cm^2   Wound Volume (cm^3) 0.049 cm^3   PhotoTaken? Yes   Time Out 1430   Debridement Percentage (%) 100   Post-Procedure Length (cm) 0.5 cm   Post-Procedure Width (cm) 0.5 cm   Post-Procedure Depth (cm) 0.1 cm   Post-Procedure Surface Area (cm^2) 0.25 cm^2   Post-Procedure Volume (cm^3) 0.025 cm^3   Wound Bed % Necrotic Tissue Eschar 100 %        Plan:  The below orders were released and performed during the visit:   Complete Wound Care  Every visit  Diagnosis: Diabetic ulcer  Dressing change(s) to be done by: Wound Care Team  Dressing change(s) to be done by: Other (specify)  Other (specify): patient  Dressing frequency: Three times/week (specify)  Dressing change(s) to be done using: Clean Technique  Clean wound with: Normal saline  Clean wound with: Vashe  Protect periwound with: Skin prep  Topical agents: Collagen with silver  Topical agents: Other (specify)  Apply Other (specify) to: Ling AG, Hydrogel  Dressing type: Bandaid    Order Comments:  Apply lidocaine 2% gel to ulcer bed as needed for patient comfort or predebridement.       Clinical  Procedures performed this visit:  Excisional sharp debridement    Departure Instruction: Simple D/C (Rx,  simple instructions) and Patient Process: Simple    Departure Disposition: Depart without assistance    Follow Up  Return in about 1 week (around 6/29/2022) for follow up with Dr. Gonzalez.

## 2022-07-05 RX ORDER — LISINOPRIL 10 MG/1
10 TABLET ORAL DAILY
Qty: 90 TABLET | Refills: 0 | Status: SHIPPED | OUTPATIENT
Start: 2022-07-05 | End: 2022-10-18

## 2022-07-27 RX ORDER — ATORVASTATIN CALCIUM 20 MG/1
20 TABLET, FILM COATED ORAL NIGHTLY
Qty: 90 TABLET | Refills: 0 | Status: SHIPPED | OUTPATIENT
Start: 2022-07-27 | End: 2022-10-18

## 2022-08-12 ENCOUNTER — PATIENT MESSAGE (OUTPATIENT)
Dept: INTERNAL MEDICINE CLINIC | Facility: CLINIC | Age: 77
End: 2022-08-12

## 2022-08-12 DIAGNOSIS — Z12.31 VISIT FOR SCREENING MAMMOGRAM: Primary | ICD-10-CM

## 2022-08-12 NOTE — TELEPHONE ENCOUNTER
From: Luz Hyatt  To: Vanna Landry. Lori Carbone MD  Sent: 8/12/2022 10:55 AM CDT  Subject: Mammogram     Dr. Lori Carbone,     Can you please put in an order for a mammogram? I can't do it until end of December but need to make the appointment now so I can get it in before the end of the year. Many thanks. Jodean Creighton Jodean Creighton Jodean Creighton Franne Sandhoff

## 2022-09-29 ENCOUNTER — OFFICE VISIT (OUTPATIENT)
Dept: GASTROENTEROLOGY | Facility: CLINIC | Age: 77
End: 2022-09-29
Payer: MEDICARE

## 2022-09-29 VITALS
SYSTOLIC BLOOD PRESSURE: 133 MMHG | HEART RATE: 69 BPM | HEIGHT: 59 IN | RESPIRATION RATE: 18 BRPM | WEIGHT: 168 LBS | DIASTOLIC BLOOD PRESSURE: 77 MMHG | BODY MASS INDEX: 33.87 KG/M2

## 2022-09-29 DIAGNOSIS — D50.9 IRON DEFICIENCY ANEMIA, UNSPECIFIED IRON DEFICIENCY ANEMIA TYPE: Primary | ICD-10-CM

## 2022-09-29 PROCEDURE — 99203 OFFICE O/P NEW LOW 30 MIN: CPT | Performed by: INTERNAL MEDICINE

## 2022-10-04 NOTE — PATIENT INSTRUCTIONS
1.  Obtain blood work in the next few weeks. 2.  We will discuss upper endoscopy with or without colonoscopy pending these results.

## 2022-10-07 ENCOUNTER — LAB ENCOUNTER (OUTPATIENT)
Dept: LAB | Facility: HOSPITAL | Age: 77
End: 2022-10-07
Attending: INTERNAL MEDICINE
Payer: MEDICARE

## 2022-10-07 DIAGNOSIS — D64.9 ANEMIA, UNSPECIFIED TYPE: ICD-10-CM

## 2022-10-07 DIAGNOSIS — D50.9 IRON DEFICIENCY ANEMIA, UNSPECIFIED IRON DEFICIENCY ANEMIA TYPE: ICD-10-CM

## 2022-10-07 LAB
BASOPHILS # BLD AUTO: 0.07 X10(3) UL (ref 0–0.2)
BASOPHILS NFR BLD AUTO: 1.2 %
DEPRECATED HBV CORE AB SER IA-ACNC: 8.2 NG/ML
DEPRECATED RDW RBC AUTO: 52.9 FL (ref 35.1–46.3)
EOSINOPHIL # BLD AUTO: 0.29 X10(3) UL (ref 0–0.7)
EOSINOPHIL NFR BLD AUTO: 5.1 %
ERYTHROCYTE [DISTWIDTH] IN BLOOD BY AUTOMATED COUNT: 17 % (ref 11–15)
HCT VFR BLD AUTO: 39.5 %
HGB BLD-MCNC: 12.5 G/DL
IGA SERPL-MCNC: 194 MG/DL (ref 70–312)
IMM GRANULOCYTES # BLD AUTO: 0.02 X10(3) UL (ref 0–1)
IMM GRANULOCYTES NFR BLD: 0.4 %
IRON SATN MFR SERPL: 28 %
IRON SERPL-MCNC: 110 UG/DL
LYMPHOCYTES # BLD AUTO: 1.55 X10(3) UL (ref 1–4)
LYMPHOCYTES NFR BLD AUTO: 27.3 %
MCH RBC QN AUTO: 27 PG (ref 26–34)
MCHC RBC AUTO-ENTMCNC: 31.6 G/DL (ref 31–37)
MCV RBC AUTO: 85.3 FL
MONOCYTES # BLD AUTO: 0.63 X10(3) UL (ref 0.1–1)
MONOCYTES NFR BLD AUTO: 11.1 %
NEUTROPHILS # BLD AUTO: 3.12 X10 (3) UL (ref 1.5–7.7)
NEUTROPHILS # BLD AUTO: 3.12 X10(3) UL (ref 1.5–7.7)
NEUTROPHILS NFR BLD AUTO: 54.9 %
PLATELET # BLD AUTO: 285 10(3)UL (ref 150–450)
RBC # BLD AUTO: 4.63 X10(6)UL
TIBC SERPL-MCNC: 393 UG/DL (ref 240–450)
TRANSFERRIN SERPL-MCNC: 264 MG/DL (ref 200–360)
WBC # BLD AUTO: 5.7 X10(3) UL (ref 4–11)

## 2022-10-07 PROCEDURE — 84466 ASSAY OF TRANSFERRIN: CPT

## 2022-10-07 PROCEDURE — 82784 ASSAY IGA/IGD/IGG/IGM EACH: CPT

## 2022-10-07 PROCEDURE — 86364 TISS TRNSGLTMNASE EA IG CLAS: CPT

## 2022-10-07 PROCEDURE — 36415 COLL VENOUS BLD VENIPUNCTURE: CPT

## 2022-10-07 PROCEDURE — 85025 COMPLETE CBC W/AUTO DIFF WBC: CPT

## 2022-10-07 PROCEDURE — 82728 ASSAY OF FERRITIN: CPT

## 2022-10-07 PROCEDURE — 83540 ASSAY OF IRON: CPT

## 2022-10-11 LAB — TTG IGA SER-ACNC: 0.6 U/ML (ref ?–7)

## 2022-10-18 RX ORDER — LISINOPRIL 10 MG/1
10 TABLET ORAL DAILY
Qty: 90 TABLET | Refills: 0 | Status: SHIPPED | OUTPATIENT
Start: 2022-10-18 | End: 2023-01-23

## 2022-10-18 RX ORDER — ATORVASTATIN CALCIUM 20 MG/1
20 TABLET, FILM COATED ORAL NIGHTLY
Qty: 90 TABLET | Refills: 0 | Status: SHIPPED | OUTPATIENT
Start: 2022-10-18

## 2022-10-18 NOTE — TELEPHONE ENCOUNTER
Refill passed per [address] protocol. Requested Prescriptions   Pending Prescriptions Disp Refills    ATORVASTATIN 20 MG Oral Tab [Pharmacy Med Name: Atorvastatin Calcium 20 Mg Tab Nort] 90 tablet 0     Sig: TAKE 1 TABLET BY MOUTH NIGHTLY.         Cholesterol Medication Protocol Passed - 10/18/2022  1:43 PM        Passed - ALT in past 12 months        Passed - LDL in past 12 months        Passed - Last ALT < 80       Lab Results   Component Value Date    ALT 33 11/18/2021             Passed - Last LDL < 130     Lab Results   Component Value Date    LDL 86 11/18/2021               Passed - In person appointment or virtual visit in the past 12 mos or appointment in next 3 mos       Recent Outpatient Visits              2 weeks ago Iron deficiency anemia, unspecified iron deficiency anemia type    Sarah Allen MD    Office Visit    11 months ago Hypercholesterolemia    [address], [address], Alondra Farooq MD    Office Visit    11 months ago     [facility])    Nurse Only    1 year ago Elevated BP without diagnosis of hypertension    [address], [address], Alondra Farooq MD    Office Visit    1 year ago BP check    [address], [address], [facility]    Nurse Only     Future Appointments         Provider Department Appt Notes    In 1 week Monica Hamilton MD [facility] for Health Ophthalmology np ee     In 1 month Maura Piedra MD [address], [address], [place] visit    In 2 months [address] for cancer                     Recent Outpatient Visits              2 weeks ago Iron deficiency anemia, unspecified iron deficiency anemia type    Sarah Allen MD    Office Visit    11 months ago Hypercholesterolemia    [address], [address], Tray Ruiz, Neeta Mann MD    Office Visit    11 months ago     [facility] ([facility])    Nurse Only    1 year ago Elevated BP without diagnosis of hypertension    [address], [address], Melvin Unger MD    Office Visit    1 year ago BP check    [address], [address], [facility]    Nurse Only            Future Appointments         Provider Department Appt Notes    In 1 week Parisa Guevara MD [facility] for Health Ophthalmology np ee     In 1 month Inna Steinberg MD [address], [address], [facility] Annual visit    In 2 months [facility] [address] for cancer

## 2022-10-27 ENCOUNTER — OFFICE VISIT (OUTPATIENT)
Dept: OPHTHALMOLOGY | Facility: CLINIC | Age: 77
End: 2022-10-27
Payer: MEDICARE

## 2022-10-27 DIAGNOSIS — H43.393 FLOATERS IN VISUAL FIELD, BILATERAL: ICD-10-CM

## 2022-10-27 DIAGNOSIS — H25.13 AGE-RELATED NUCLEAR CATARACT OF BOTH EYES: Primary | ICD-10-CM

## 2022-10-27 PROCEDURE — 92015 DETERMINE REFRACTIVE STATE: CPT | Performed by: OPHTHALMOLOGY

## 2022-10-27 PROCEDURE — 92004 COMPRE OPH EXAM NEW PT 1/>: CPT | Performed by: OPHTHALMOLOGY

## 2022-10-27 NOTE — PATIENT INSTRUCTIONS
Floaters in visual field, bilateral   There is no evidence of retinal pathology. All signs and symptoms of retinal detachment/tears explained in detail. Patient instructed to call the office if they experience increase in floaters, increase in flashes of light, loss of vision or curtain or veil effect. Age-related nuclear cataract of both eyes  Discussed with patient that cataract in both eyes are advanced enough at this time to consider surgery; it would be patient's choice. Discussed options such as surgery or change of glasses RX. Discussed surgical risks, benefits, alternatives and recovery. Patient understands that changing glasses will not significantly improve vision and elects to have surgery. As Dr. Danika Botello is no longer performing cataract surgery, a referral to [facility] was offered. Patient agrees to be referred, so we will send complete exam and information to them and the patient will be contacted. Information on [address] [place] [address] ophthalmology given and reviewed with the patient.

## 2022-10-27 NOTE — ASSESSMENT & PLAN NOTE
Discussed with patient that cataract in both eyes are advanced enough at this time to consider surgery; it would be patient's choice. Discussed options such as surgery or change of glasses RX. Discussed surgical risks, benefits, alternatives and recovery. Patient understands that changing glasses will not significantly improve vision and elects to have surgery. As Dr. Aga Malave is no longer performing cataract surgery, a referral to [facility] was offered. Patient agrees to be referred, so we will send complete exam and information to them and the patient will be contacted. Information on [address] [place] [address] ophthalmology given and reviewed with the patient.

## 2022-11-22 ENCOUNTER — OFFICE VISIT (OUTPATIENT)
Dept: INTERNAL MEDICINE CLINIC | Facility: CLINIC | Age: 77
End: 2022-11-22
Payer: MEDICARE

## 2022-11-22 VITALS
OXYGEN SATURATION: 99 % | DIASTOLIC BLOOD PRESSURE: 86 MMHG | WEIGHT: 167.38 LBS | BODY MASS INDEX: 33.74 KG/M2 | HEART RATE: 72 BPM | HEIGHT: 59 IN | RESPIRATION RATE: 16 BRPM | TEMPERATURE: 97 F | SYSTOLIC BLOOD PRESSURE: 136 MMHG

## 2022-11-22 DIAGNOSIS — E78.00 HYPERCHOLESTEROLEMIA: Primary | ICD-10-CM

## 2022-11-22 DIAGNOSIS — Z71.85 VACCINE COUNSELING: ICD-10-CM

## 2022-11-22 DIAGNOSIS — Z12.31 VISIT FOR SCREENING MAMMOGRAM: ICD-10-CM

## 2022-11-22 DIAGNOSIS — H25.13 AGE-RELATED NUCLEAR CATARACT OF BOTH EYES: ICD-10-CM

## 2022-11-22 DIAGNOSIS — E55.9 VITAMIN D DEFICIENCY: ICD-10-CM

## 2022-11-22 DIAGNOSIS — R03.0 ELEVATED BP WITHOUT DIAGNOSIS OF HYPERTENSION: ICD-10-CM

## 2022-11-22 DIAGNOSIS — D50.0 IRON DEFICIENCY ANEMIA DUE TO CHRONIC BLOOD LOSS: ICD-10-CM

## 2022-11-22 DIAGNOSIS — K57.30 DIVERTICULOSIS OF COLON: ICD-10-CM

## 2022-11-22 DIAGNOSIS — L21.9 SEBORRHEIC DERMATITIS: ICD-10-CM

## 2022-11-22 DIAGNOSIS — L40.9 PSORIASIS AND SIMILAR DISORDER: ICD-10-CM

## 2022-11-22 DIAGNOSIS — Z78.0 ASYMPTOMATIC MENOPAUSAL STATE: ICD-10-CM

## 2022-11-22 DIAGNOSIS — H43.393 FLOATERS IN VISUAL FIELD, BILATERAL: ICD-10-CM

## 2022-11-22 DIAGNOSIS — M85.80 SENILE OSTEOPENIA: ICD-10-CM

## 2022-11-22 LAB
ALBUMIN SERPL-MCNC: 3.9 G/DL (ref 3.4–5)
ALBUMIN/GLOB SERPL: 1.4 {RATIO} (ref 1–2)
ALP LIVER SERPL-CCNC: 58 U/L
ALT SERPL-CCNC: 29 U/L
ANION GAP SERPL CALC-SCNC: 6 MMOL/L (ref 0–18)
APPEARANCE: CLEAR
AST SERPL-CCNC: 12 U/L (ref 15–37)
BILIRUB SERPL-MCNC: 0.4 MG/DL (ref 0.1–2)
BILIRUBIN: NEGATIVE
BUN BLD-MCNC: 18 MG/DL (ref 7–18)
BUN/CREAT SERPL: 27.3 (ref 10–20)
CALCIUM BLD-MCNC: 9.2 MG/DL (ref 8.5–10.1)
CHLORIDE SERPL-SCNC: 107 MMOL/L (ref 98–112)
CHOLEST SERPL-MCNC: 155 MG/DL (ref ?–200)
CO2 SERPL-SCNC: 26 MMOL/L (ref 21–32)
CREAT BLD-MCNC: 0.66 MG/DL
FASTING PATIENT LIPID ANSWER: YES
FASTING STATUS PATIENT QL REPORTED: YES
GFR SERPLBLD BASED ON 1.73 SQ M-ARVRAT: 90 ML/MIN/1.73M2 (ref 60–?)
GLOBULIN PLAS-MCNC: 2.8 G/DL (ref 2.8–4.4)
GLUCOSE (URINE DIPSTICK): NEGATIVE MG/DL
GLUCOSE BLD-MCNC: 78 MG/DL (ref 70–99)
HDLC SERPL-MCNC: 76 MG/DL (ref 40–59)
KETONES (URINE DIPSTICK): NEGATIVE MG/DL
LDLC SERPL CALC-MCNC: 66 MG/DL (ref ?–100)
MULTISTIX LOT#: ABNORMAL NUMERIC
NITRITE, URINE: NEGATIVE
NONHDLC SERPL-MCNC: 79 MG/DL (ref ?–130)
OCCULT BLOOD: NEGATIVE
OSMOLALITY SERPL CALC.SUM OF ELEC: 289 MOSM/KG (ref 275–295)
PH, URINE: 5 (ref 4.5–8)
POTASSIUM SERPL-SCNC: 4 MMOL/L (ref 3.5–5.1)
PROT SERPL-MCNC: 6.7 G/DL (ref 6.4–8.2)
PROTEIN (URINE DIPSTICK): NEGATIVE MG/DL
SODIUM SERPL-SCNC: 139 MMOL/L (ref 136–145)
SPECIFIC GRAVITY: 1.02 (ref 1–1.03)
TRIGL SERPL-MCNC: 65 MG/DL (ref 30–149)
URINE-COLOR: YELLOW
UROBILINOGEN,SEMI-QN: 0.2 MG/DL (ref 0–1.9)
VIT D+METAB SERPL-MCNC: 37.1 NG/ML (ref 30–100)
VLDLC SERPL CALC-MCNC: 10 MG/DL (ref 0–30)

## 2022-11-22 PROCEDURE — 36415 COLL VENOUS BLD VENIPUNCTURE: CPT | Performed by: INTERNAL MEDICINE

## 2022-11-22 RX ORDER — MELATONIN
325
COMMUNITY

## 2022-11-22 RX ORDER — ASCORBIC ACID 500 MG
500 TABLET ORAL DAILY
COMMUNITY

## 2022-11-22 RX ORDER — TOPIRAMATE 25 MG/1
50 TABLET ORAL EVERY EVENING
Qty: 60 TABLET | Refills: 2 | Status: SHIPPED | OUTPATIENT
Start: 2022-11-22

## 2022-12-07 ENCOUNTER — PATIENT MESSAGE (OUTPATIENT)
Dept: INTERNAL MEDICINE CLINIC | Facility: CLINIC | Age: 77
End: 2022-12-07

## 2022-12-08 ENCOUNTER — TELEMEDICINE (OUTPATIENT)
Dept: INTERNAL MEDICINE CLINIC | Facility: CLINIC | Age: 77
End: 2022-12-08

## 2022-12-08 DIAGNOSIS — R03.0 ELEVATED BP WITHOUT DIAGNOSIS OF HYPERTENSION: ICD-10-CM

## 2022-12-08 DIAGNOSIS — U07.1 COVID-19: Primary | ICD-10-CM

## 2022-12-08 DIAGNOSIS — E78.00 HYPERCHOLESTEROLEMIA: ICD-10-CM

## 2022-12-08 PROCEDURE — 99214 OFFICE O/P EST MOD 30 MIN: CPT | Performed by: NURSE PRACTITIONER

## 2022-12-08 RX ORDER — NIRMATRELVIR AND RITONAVIR 300-100 MG
KIT ORAL
Qty: 30 TABLET | Refills: 0 | Status: SHIPPED | OUTPATIENT
Start: 2022-12-08 | End: 2022-12-13

## 2022-12-08 RX ORDER — ALBUTEROL SULFATE 90 UG/1
2 AEROSOL, METERED RESPIRATORY (INHALATION) EVERY 4 HOURS PRN
Qty: 6.7 G | Refills: 0 | Status: SHIPPED | OUTPATIENT
Start: 2022-12-08

## 2022-12-08 NOTE — PATIENT INSTRUCTIONS
Diagnoses and all orders for this visit:    COVID-19  Patient directed to isolate away form any household members as much as possible and the general public COMPLETELY for 5 days. If you have no symptoms or your symptoms are resolving after 5 days your can leave your house with mask for an additional 5 days. If you have a fever, continue to stay home until your fever resolves. Avoid any close contacts. Avoid gathering at this time. Keep the distance of at least 6 feet with other people including family member in the house or outside. Frequent handwashing. Instructed that if patient develops chest pain, altered mental status/confusion, or if unable to speak full sentence due to shortness of breath, should to come to emergency department. May take Tylenol 500 mg every 6 hours as needed for pain/temperature. Unless contraindicated due to chronic illness. Max 3000 mg in 24 hours. Take Vitamin C and D. Please get plenty of rest and increase your intake of oral fluids. Start paxlovid as prescribed. Use albuterol inhaler every 4-6 hours as needed for wheezing      Del Glynn advised to follow CDC guidelines for self isolation and symptomatic treatment as outlined on CDC Patient Guidelines. Del Glynn understands phone evaluation is not a substitute for face-to-face examination or emergency care. Patient advised to go to ER or call 911 for worsening symptoms or acute distress. PAULIE Junior    Elevated BP without diagnosis of hypertension  Careful with diet and excercise at least 30 minutes 3-4 times a week. Check blood pressures at different times on different days. Can purchase own blood pressure monitor. If not, check at local pharmacy. Bake foods more and grill occasionally. Avoid fried foods. No salt. Use other seasonings.      Hypercholesterolemia  Educated on holding atorvastatin while on paxlovid    Other orders  -     nirmatrelvir&ritonavir 300/100 (PAXLOVID, 300/100,) 20 x 150 MG & 10 x 100MG Oral Tablet Therapy Pack; Take two nirmatrelvir tablets (300mg) with one ritonavir tablet (100mg) together twice daily for 5 days. -     albuterol (VENTOLIN HFA) 108 (90 Base) MCG/ACT Inhalation Aero Soln; Inhale 2 puffs into the lungs every 4 (four) hours as needed for Wheezing.

## 2022-12-12 ENCOUNTER — TELEPHONE (OUTPATIENT)
Dept: INTERNAL MEDICINE CLINIC | Facility: CLINIC | Age: 77
End: 2022-12-12

## 2022-12-12 ENCOUNTER — PATIENT MESSAGE (OUTPATIENT)
Dept: INTERNAL MEDICINE CLINIC | Facility: CLINIC | Age: 77
End: 2022-12-12

## 2022-12-12 NOTE — TELEPHONE ENCOUNTER
Called patient (name and  verified) and instructed on providers message below. Symptoms have improved, rash is improving but itching. Patient asks if she can take Benadryl with Paxlovid. No interactions between the 2 medications noted per Internet search. Patient will take her last Paxlovid tonight. Patient denies any new symptoms.

## 2022-12-18 ENCOUNTER — PATIENT MESSAGE (OUTPATIENT)
Dept: INTERNAL MEDICINE CLINIC | Facility: CLINIC | Age: 77
End: 2022-12-18

## 2022-12-19 RX ORDER — MONTELUKAST SODIUM 10 MG/1
10 TABLET ORAL NIGHTLY
Qty: 7 TABLET | Refills: 0 | Status: SHIPPED | OUTPATIENT
Start: 2022-12-19

## 2022-12-19 NOTE — TELEPHONE ENCOUNTER
From: Panda Kramer  To: Jill Lau. Tamiko Rothman MD  Sent: 12/12/2022 9:53 AM CST  Subject: Covid    Dr. Tamiko Rothman,    Today I gave a covid test to [facility] and it came back positive. He doesn't have usual symptoms. Day before yesterday he felt ill and spent the day in bed and then yesterday started with a deep cough. His temperature today is normal. He feels fine. I guess he will quarantine for next 5 days and hope for the best.    Thank you. Banuelos Riding Banuelos Riding Ash Portillo

## 2022-12-22 ENCOUNTER — HOSPITAL ENCOUNTER (OUTPATIENT)
Dept: MAMMOGRAPHY | Age: 77
Discharge: HOME | End: 2022-12-22
Attending: INTERNAL MEDICINE
Payer: MEDICARE

## 2022-12-22 DIAGNOSIS — Z12.31 VISIT FOR SCREENING MAMMOGRAM: ICD-10-CM

## 2022-12-22 PROCEDURE — 77063 BREAST TOMOSYNTHESIS BI: CPT | Performed by: INTERNAL MEDICINE

## 2022-12-22 PROCEDURE — 77067 SCR MAMMO BI INCL CAD: CPT | Performed by: INTERNAL MEDICINE

## 2022-12-29 ENCOUNTER — HOSPITAL ENCOUNTER (OUTPATIENT)
Dept: BONE DENSITY | Age: 77
Discharge: HOME | End: 2022-12-29
Attending: INTERNAL MEDICINE
Payer: MEDICARE

## 2022-12-29 DIAGNOSIS — M85.80 SENILE OSTEOPENIA: ICD-10-CM

## 2022-12-29 DIAGNOSIS — Z78.0 ASYMPTOMATIC MENOPAUSAL STATE: ICD-10-CM

## 2022-12-29 PROCEDURE — 77080 DXA BONE DENSITY AXIAL: CPT | Performed by: INTERNAL MEDICINE

## 2023-01-23 RX ORDER — ATORVASTATIN CALCIUM 20 MG/1
20 TABLET, FILM COATED ORAL NIGHTLY
Qty: 90 TABLET | Refills: 1 | Status: SHIPPED | OUTPATIENT
Start: 2023-01-23

## 2023-01-23 RX ORDER — LISINOPRIL 10 MG/1
10 TABLET ORAL DAILY
Qty: 90 TABLET | Refills: 1 | Status: SHIPPED | OUTPATIENT
Start: 2023-01-23

## 2023-01-23 NOTE — TELEPHONE ENCOUNTER
Refill passed per [facility] protocol   Requested Prescriptions   Pending Prescriptions Disp Refills    LISINOPRIL 10 MG Oral Tab [Pharmacy Med Name: Lisinopril 10 Mg Tab Solc] 90 tablet 0     Sig: TAKE ONE TABLET BY MOUTH ONE TIME DAILY       Hypertensive Medications Protocol Passed - 1/22/2023 11:47 AM        Passed - In person appointment in the past 12 or next 3 months     Recent Outpatient Visits              1 month ago COVID-19    [facility], Scott Nunez, PAULIE    Telemedicine    2 months ago Hypercholesterolemia    [address], [address], Kim Raymundo MD    Office Visit    2 months ago Age-related nuclear cataract of both eyes    [address], [address], Maxwell Manuel MD    Office Visit    3 months ago Iron deficiency anemia, unspecified iron deficiency anemia type    Tonny Shone Stathopoulos, [place], MD    Office Visit    1 year ago Hypercholesterolemia    [facility], [address], Kim Raymundo MD    Office Visit          Future Appointments         Provider Department Appt Notes    In 3 months Chanell Duffy MD [address], [address], [place] follow up     In 9 months Betsy Zaragoza MD [address], [address], [place] EP EE    In 10 months Chanell Duffy MD [facility], [address], [place] px                Passed - Last BP reading less than 140/90     BP Readings from Last 1 Encounters:  11/22/22 : 136/86              Passed - CMP or BMP in past 6 months     Recent Results (from the past 4392 hour(s))   COMP METABOLIC PANEL (14)    Collection Time: 11/22/22  4:18 PM   Result Value Ref Range    Glucose 78 70 - 99 mg/dL    Sodium 139 136 - 145 mmol/L    Potassium 4.0 3.5 - 5.1 mmol/L    Chloride 107 98 - 112 mmol/L    CO2 26.0 21.0 - 32.0 mmol/L Anion Gap 6 0 - 18 mmol/L    BUN 18 7 - 18 mg/dL    Creatinine 0.66 0.55 - 1.02 mg/dL    BUN/CREA Ratio 27.3 (H) 10.0 - 20.0    Calcium, Total 9.2 8.5 - 10.1 mg/dL    Calculated Osmolality 289 275 - 295 mOsm/kg    eGFR-Cr 90 >=60 mL/min/1.73m2    ALT 29 13 - 56 U/L    AST 12 (L) 15 - 37 U/L    Alkaline Phosphatase 58 55 - 142 U/L    Bilirubin, Total 0.4 0.1 - 2.0 mg/dL    Total Protein 6.7 6.4 - 8.2 g/dL    Albumin 3.9 3.4 - 5.0 g/dL    Globulin  2.8 2.8 - 4.4 g/dL    A/G Ratio 1.4 1.0 - 2.0    Patient Fasting for CMP? Yes      *Note: Due to a large number of results and/or encounters for the requested time period, some results have not been displayed. A complete set of results can be found in Results Review.                Passed - In person appointment or virtual visit in the past 6 months     Recent Outpatient Visits              1 month ago COVID-19    [facility], Scott Nunez, PAULIE    Telemedicine    2 months ago Hypercholesterolemia    [address], [address], Beulah Celis MD    Office Visit    2 months ago Age-related nuclear cataract of both eyes    Saul Haynes MD    Office Visit    3 months ago Iron deficiency anemia, unspecified iron deficiency anemia type    Ashlee Nguyen MD    Office Visit    1 year ago Hypercholesterolemia    [address], [address], Beulah Celis MD    Office Visit          Future Appointments         Provider Department Appt Notes    In 3 months Rajwinder Harrington MD [address], [address], [place] follow up     In 9 months MD Ankur Youssef, [employer] EP EE    In 10 months MD Ayaka Barrett[address], [address], [place] px                [place] [facility] or GFRNAA > 50     GFR Evaluation  EGFRCR: 90 , resulted on 11/22/2022            ATORVASTATIN 20 MG Oral Tab [Pharmacy Med Name: Atorvastatin Calcium 20 Mg Tab Nort] 90 tablet 0     Sig: TAKE 1 TABLET BY MOUTH NIGHTLY.        Cholesterol Medication Protocol Passed - 1/22/2023 11:47 AM        Passed - ALT in past 12 months        Passed - LDL in past 12 months        Passed - Last ALT < 80     Lab Results   Component Value Date    ALT 29 11/22/2022             Passed - Last LDL < 130     Lab Results   Component Value Date    LDL 66 11/22/2022             Passed - In person appointment or virtual visit in the past 12 mos or appointment in next 3 mos     Recent Outpatient Visits              1 month ago [address], [address], Scott Nunez APRN    Telemedicine    2 months ago Hypercholesterolemia    [address], [address], Kim Raymundo MD    Office Visit    2 months ago Age-related nuclear cataract of both eyes    [address] [facility], Maxwell Manuel MD    Office Visit    3 months ago Iron deficiency anemia, unspecified iron deficiency anemia type    Tonny Shone Stathopoulos, [place], MD    Office Visit    1 year ago Hypercholesterolemia    [address], [address], Kim Raymundo MD    Office Visit          Future Appointments         Provider Department Appt Notes    In 3 months Chanell Duffy MD [address], [address], [place] follow up     In 9 months Betsy Zaragoza MD [address] [facility], [place] EP EE    In 10 months Chanell Duffy MD [address], [address], [place] px

## 2023-01-25 ENCOUNTER — APPOINTMENT (OUTPATIENT)
Dept: URBAN - METROPOLITAN AREA CLINIC 244 | Age: 78
Setting detail: DERMATOLOGY
End: 2023-01-27

## 2023-01-25 DIAGNOSIS — D22 MELANOCYTIC NEVI: ICD-10-CM

## 2023-01-25 DIAGNOSIS — L82.1 OTHER SEBORRHEIC KERATOSIS: ICD-10-CM

## 2023-01-25 DIAGNOSIS — L81.4 OTHER MELANIN HYPERPIGMENTATION: ICD-10-CM

## 2023-01-25 PROBLEM — D22.5 MELANOCYTIC NEVI OF TRUNK: Status: ACTIVE | Noted: 2023-01-25

## 2023-01-25 PROCEDURE — 99203 OFFICE O/P NEW LOW 30 MIN: CPT

## 2023-01-25 PROCEDURE — OTHER COUNSELING: OTHER

## 2023-01-25 ASSESSMENT — LOCATION DETAILED DESCRIPTION DERM
LOCATION DETAILED: UPPER STERNUM
LOCATION DETAILED: LEFT MEDIAL UPPER BACK
LOCATION DETAILED: RIGHT SUPERIOR MEDIAL UPPER BACK

## 2023-01-25 ASSESSMENT — LOCATION SIMPLE DESCRIPTION DERM
LOCATION SIMPLE: CHEST
LOCATION SIMPLE: LEFT UPPER BACK
LOCATION SIMPLE: RIGHT UPPER BACK

## 2023-01-25 ASSESSMENT — LOCATION ZONE DERM: LOCATION ZONE: TRUNK

## 2023-01-30 ENCOUNTER — MED REC SCAN ONLY (OUTPATIENT)
Dept: INTERNAL MEDICINE CLINIC | Facility: CLINIC | Age: 78
End: 2023-01-30

## 2023-06-27 ENCOUNTER — LAB ENCOUNTER (OUTPATIENT)
Dept: LAB | Facility: HOSPITAL | Age: 78
End: 2023-06-27
Attending: INTERNAL MEDICINE
Payer: MEDICARE

## 2023-06-27 DIAGNOSIS — D64.9 ANEMIA, UNSPECIFIED TYPE: ICD-10-CM

## 2023-06-27 LAB
BASOPHILS # BLD AUTO: 0.05 X10(3) UL (ref 0–0.2)
BASOPHILS NFR BLD AUTO: 0.8 %
DEPRECATED HBV CORE AB SER IA-ACNC: 68.3 NG/ML
DEPRECATED RDW RBC AUTO: 41.7 FL (ref 35.1–46.3)
EOSINOPHIL # BLD AUTO: 0.18 X10(3) UL (ref 0–0.7)
EOSINOPHIL NFR BLD AUTO: 2.9 %
ERYTHROCYTE [DISTWIDTH] IN BLOOD BY AUTOMATED COUNT: 12.6 % (ref 11–15)
HCT VFR BLD AUTO: 40.8 %
HGB BLD-MCNC: 13.7 G/DL
IMM GRANULOCYTES # BLD AUTO: 0.01 X10(3) UL (ref 0–1)
IMM GRANULOCYTES NFR BLD: 0.2 %
IRON SATN MFR SERPL: 61 %
IRON SERPL-MCNC: 190 UG/DL
LYMPHOCYTES # BLD AUTO: 1.42 X10(3) UL (ref 1–4)
LYMPHOCYTES NFR BLD AUTO: 22.6 %
MCH RBC QN AUTO: 30.2 PG (ref 26–34)
MCHC RBC AUTO-ENTMCNC: 33.6 G/DL (ref 31–37)
MCV RBC AUTO: 90.1 FL
MONOCYTES # BLD AUTO: 0.52 X10(3) UL (ref 0.1–1)
MONOCYTES NFR BLD AUTO: 8.3 %
NEUTROPHILS # BLD AUTO: 4.1 X10 (3) UL (ref 1.5–7.7)
NEUTROPHILS # BLD AUTO: 4.1 X10(3) UL (ref 1.5–7.7)
NEUTROPHILS NFR BLD AUTO: 65.2 %
PLATELET # BLD AUTO: 255 10(3)UL (ref 150–450)
RBC # BLD AUTO: 4.53 X10(6)UL
TIBC SERPL-MCNC: 313 UG/DL (ref 240–450)
TRANSFERRIN SERPL-MCNC: 210 MG/DL (ref 200–360)
WBC # BLD AUTO: 6.3 X10(3) UL (ref 4–11)

## 2023-06-27 PROCEDURE — 84466 ASSAY OF TRANSFERRIN: CPT

## 2023-06-27 PROCEDURE — 36415 COLL VENOUS BLD VENIPUNCTURE: CPT

## 2023-06-27 PROCEDURE — 83540 ASSAY OF IRON: CPT

## 2023-06-27 PROCEDURE — 85025 COMPLETE CBC W/AUTO DIFF WBC: CPT

## 2023-06-27 PROCEDURE — 82728 ASSAY OF FERRITIN: CPT

## 2023-07-23 RX ORDER — LISINOPRIL 10 MG/1
10 TABLET ORAL DAILY
Qty: 90 TABLET | Refills: 3 | Status: SHIPPED | OUTPATIENT
Start: 2023-07-23

## 2023-07-23 RX ORDER — ATORVASTATIN CALCIUM 20 MG/1
20 TABLET, FILM COATED ORAL NIGHTLY
Qty: 90 TABLET | Refills: 3 | Status: SHIPPED | OUTPATIENT
Start: 2023-07-23

## 2023-07-23 NOTE — TELEPHONE ENCOUNTER
Passed protocol medication refilled. CMP out of date range for protocol. No active orders. Please advise on refill request.    Requested Prescriptions     Pending Prescriptions Disp Refills    ATORVASTATIN 20 MG Oral Tab [Pharmacy Med Name: Atorvastatin Calcium 20 Mg Tab Nort] 90 tablet 0     Sig: TAKE 1 TABLET BY MOUTH NIGHTLY. LISINOPRIL 10 MG Oral Tab [Pharmacy Med Name: Lisinopril 10 Mg Tab Solc] 90 tablet 0     Sig: TAKE ONE TABLET BY MOUTH ONE TIME DAILY      Recent Visits  Date Type Provider Dept   11/22/22 Office Visit Najma Templeton MD [id number] Med   Showing recent visits within past 540 days with a meds authorizing provider and meeting all other requirements  Future Appointments  Date Type Provider Dept   11/21/23 Appointment Najma Templeton MD [id number] Med   Showing future appointments within next 150 days with a meds authorizing provider and meeting all other requirements     Requested Prescriptions   Pending Prescriptions Disp Refills    ATORVASTATIN 20 MG Oral Tab [Pharmacy Med Name: Atorvastatin Calcium 20 Mg Tab Nort] 90 tablet 0     Sig: TAKE 1 TABLET BY MOUTH NIGHTLY.        Cholesterol Medication Protocol Passed - 7/22/2023 10:19 AM        Passed - ALT in past 12 months        Passed - LDL in past 12 months        Passed - Last ALT < 80     Lab Results   Component Value Date    ALT 29 11/22/2022             Passed - Last LDL < 130     Lab Results   Component Value Date    LDL 66 11/22/2022             Passed - In person appointment or virtual visit in the past 12 mos or appointment in next 3 mos     Recent Outpatient Visits              7 months ago COVID-19    [address], [address], Scott Nunez, PAULIE    Telemedicine    8 months ago Hypercholesterolemia    [address], [address], Arjun Rucker MD    Office Visit    8 months ago Age-related nuclear cataract of both eyes    [facility], [address], Dylan Seaman MD    Office Visit    9 months ago Iron deficiency anemia, unspecified iron deficiency anemia type    Petra Granger MD    Office Visit    1 year ago Hypercholesterolemia    [facility], [address], Sherell Lesches., MD    Office Visit          Future Appointments         Provider Department Appt Notes    In 3 months MD Shaheen Aragon, [address], [place] [facility] EE    In 4 months MD Shaheen Laboy, [address], [place]     In 5 months ADO DEXA RM1; ADO DYLLAN [address] [facility] [place]                  LISINOPRIL 10 MG Oral Tab [Pharmacy Med Name: Lisinopril 10 Mg Tab Solc] 90 tablet 0     Sig: TAKE ONE TABLET BY MOUTH ONE TIME DAILY       Hypertensive Medications Protocol Failed - 7/22/2023 10:19 AM        Failed - CMP or BMP in past 6 months     No results found for this or any previous visit (from the past 4392 hour(s)).             Failed - In person appointment or virtual visit in the past 6 months     Recent Outpatient Visits              7 months ago COVID-19    [facility], [address], Scott Nunez, APRN    Telemedicine    8 months ago Hypercholesterolemia    Sh[facility]en Llamas, [address] [facility] [address], Sherell Lesches., MD    Office Visit    8 months ago Age-related nuclear cataract of both eyes    Deven Solomon MD    Office Visit    9 months ago Iron deficiency anemia, unspecified iron deficiency anemia type    Camilla Newman MD    Office Visit    1 year ago Hypercholesterolemia    [facility] Llamas, [address], Sherell Lesches., MD    Office Visit          Future Appointments         Provider Department Appt Notes In 3 months MD Chanel Rodrigues [place] [facility]    In 4 months Briseida Lackey MD [address], [address], [place]     In 5 months ADO DEXA RM1; [facility] [address] - In person appointment in the past 12 or next 3 months     Recent Outpatient Visits              7 months ago [address] [facility], [facility], [facility], APRN    Telemedicine    8 months ago Hypercholesterolemia    [address], [address], Duke Ornelas MD    Office Visit    8 months ago Age-related nuclear cataract of both [place], [address], Inocente Pate MD    Office Visit    9 months ago Iron deficiency anemia, unspecified iron deficiency anemia type    [facility], Andre Bolton MD    Office Visit    1 year ago [facility], [address], Duke Ornelas MD    Office Visit          Future Appointments         Provider Department Appt Notes    In 3 months MD Chanel Rodrigues, [place] [facility]    In 4 months Briseida Lackey MD [address], [address], [place]     In 5 months ADO DEXA RM1; ADO [facility] [address] [facility] [place]                Passed - Last BP reading less than 140/90     BP Readings from Last 1 Encounters:  11/22/22 : 136/86              Passed - EGFRCR or GFRNAA > 50     GFR Evaluation  EGFRCR: 90 , resulted on 11/22/2022              Future Appointments         Provider Department Appt Notes    In 3 months MD Chanel Rodrigues [place] [facility]    In 4 months Briseida Lackey MD [address], [address], [place]     In 5 months ADO DEXA RM1; ADO DYLLAN [facility]            Recent Outpatient Visits              7 months ago [facility], Ingrid Anastasiia, [place], APRN    Telemedicine    8 months ago Hypercholesterolemia    [facility], [address], Robert Osullivan MD    Office Visit    8 months ago Age-related nuclear cataract of both eyes    Urszula Galo, Magi Howell MD    Office Visit    9 months ago Iron deficiency anemia, unspecified iron deficiency anemia type    Ulysess Jamilah Alvarez MD    Office Visit    1 year ago Hypercholesterolemia    [facility], [address], Robert Osullivan MD    Office Visit

## 2023-08-31 NOTE — PATIENT INSTRUCTIONS
ASSESSMENT/PLAN:   Diagnoses and all orders for this visit:    Hypercholesterolemia    patient following low fat diet, recommend that she exercise regularly.    Recommend that she continue to work on the diet and exercise    Needs prevnar Dapsone Counseling: I discussed with the patient the risks of dapsone including but not limited to hemolytic anemia, agranulocytosis, rashes, methemoglobinemia, kidney failure, peripheral neuropathy, headaches, GI upset, and liver toxicity.  Patients who start dapsone require monitoring including baseline LFTs and weekly CBCs for the first month, then every month thereafter.  The patient verbalized understanding of the proper use and possible adverse effects of dapsone.  All of the patient's questions and concerns were addressed. Include Pregnancy/Lactation Warning?: No Topical Sulfur Applications Counseling: Topical Sulfur Counseling: Patient counseled that this medication may cause skin irritation or allergic reactions.  In the event of skin irritation, the patient was advised to reduce the amount of the drug applied or use it less frequently.   The patient verbalized understanding of the proper use and possible adverse effects of topical sulfur application.  All of the patient's questions and concerns were addressed. Azelaic Acid Pregnancy And Lactation Text: This medication is considered safe during pregnancy and breast feeding. Isotretinoin Pregnancy And Lactation Text: This medication is Pregnancy Category X and is considered extremely dangerous during pregnancy. It is unknown if it is excreted in breast milk. Spironolactone Counseling: Patient advised regarding risks of diarrhea, abdominal pain, hyperkalemia, birth defects (for female patients), liver toxicity and renal toxicity. The patient may need blood work to monitor liver and kidney function and potassium levels while on therapy. The patient verbalized understanding of the proper use and possible adverse effects of spironolactone.  All of the patient's questions and concerns were addressed. Sarecycline Counseling: Patient advised regarding possible photosensitivity and discoloration of the teeth, skin, lips, tongue and gums.  Patient instructed to avoid sunlight, if possible.  When exposed to sunlight, patients should wear protective clothing, sunglasses, and sunscreen.  The patient was instructed to call the office immediately if the following severe adverse effects occur:  hearing changes, easy bruising/bleeding, severe headache, or vision changes.  The patient verbalized understanding of the proper use and possible adverse effects of sarecycline.  All of the patient's questions and concerns were addressed. Birth Control Pills Counseling: Birth Control Pill Counseling: I discussed with the patient the potential side effects of OCPs including but not limited to increased risk of stroke, heart attack, thrombophlebitis, deep venous thrombosis, hepatic adenomas, breast changes, GI upset, headaches, and depression.  The patient verbalized understanding of the proper use and possible adverse effects of OCPs. All of the patient's questions and concerns were addressed. Erythromycin Pregnancy And Lactation Text: This medication is Pregnancy Category B and is considered safe during pregnancy. It is also excreted in breast milk. Topical Clindamycin Counseling: Patient counseled that this medication may cause skin irritation or allergic reactions.  In the event of skin irritation, the patient was advised to reduce the amount of the drug applied or use it less frequently.   The patient verbalized understanding of the proper use and possible adverse effects of clindamycin.  All of the patient's questions and concerns were addressed. Aklief Pregnancy And Lactation Text: It is unknown if this medication is safe to use during pregnancy.  It is unknown if this medication is excreted in breast milk.  Breastfeeding women should use the topical cream on the smallest area of the skin for the shortest time needed while breastfeeding.  Do not apply to nipple and areola. Tazorac Counseling:  Patient advised that medication is irritating and drying.  Patient may need to apply sparingly and wash off after an hour before eventually leaving it on overnight.  The patient verbalized understanding of the proper use and possible adverse effects of tazorac.  All of the patient's questions and concerns were addressed. Tetracycline Pregnancy And Lactation Text: This medication is Pregnancy Category D and not consider safe during pregnancy. It is also excreted in breast milk. Minocycline Counseling: Patient advised regarding possible photosensitivity and discoloration of the teeth, skin, lips, tongue and gums.  Patient instructed to avoid sunlight, if possible.  When exposed to sunlight, patients should wear protective clothing, sunglasses, and sunscreen.  The patient was instructed to call the office immediately if the following severe adverse effects occur:  hearing changes, easy bruising/bleeding, severe headache, or vision changes.  The patient verbalized understanding of the proper use and possible adverse effects of minocycline.  All of the patient's questions and concerns were addressed. Bactrim Counseling:  I discussed with the patient the risks of sulfa antibiotics including but not limited to GI upset, allergic reaction, drug rash, diarrhea, dizziness, photosensitivity, and yeast infections.  Rarely, more serious reactions can occur including but not limited to aplastic anemia, agranulocytosis, methemoglobinemia, blood dyscrasias, liver or kidney failure, lung infiltrates or desquamative/blistering drug rashes. Doxycycline Pregnancy And Lactation Text: This medication is Pregnancy Category D and not consider safe during pregnancy. It is also excreted in breast milk but is considered safe for shorter treatment courses. Winlevi Pregnancy And Lactation Text: This medication is considered safe during pregnancy and breastfeeding. Dapsone Pregnancy And Lactation Text: This medication is Pregnancy Category C and is not considered safe during pregnancy or breast feeding. Azithromycin Counseling:  I discussed with the patient the risks of azithromycin including but not limited to GI upset, allergic reaction, drug rash, diarrhea, and yeast infections. Topical Retinoid counseling:  Patient advised to apply a pea-sized amount only at bedtime and wait 30 minutes after washing their face before applying.  If too drying, patient may add a non-comedogenic moisturizer. The patient verbalized understanding of the proper use and possible adverse effects of retinoids.  All of the patient's questions and concerns were addressed. Topical Sulfur Applications Pregnancy And Lactation Text: This medication is Pregnancy Category C and has an unknown safety profile during pregnancy. It is unknown if this topical medication is excreted in breast milk. Spironolactone Pregnancy And Lactation Text: This medication can cause feminization of the male fetus and should be avoided during pregnancy. The active metabolite is also found in breast milk. High Dose Vitamin A Counseling: Side effects reviewed, pt to contact office should one occur. Benzoyl Peroxide Counseling: Patient counseled that medicine may cause skin irritation and bleach clothing.  In the event of skin irritation, the patient was advised to reduce the amount of the drug applied or use it less frequently.   The patient verbalized understanding of the proper use and possible adverse effects of benzoyl peroxide.  All of the patient's questions and concerns were addressed. Birth Control Pills Pregnancy And Lactation Text: This medication should be avoided if pregnant and for the first 30 days post-partum. Isotretinoin Counseling: Patient should get monthly blood tests, not donate blood, not drive at night if vision affected, not share medication, and not undergo elective surgery for 6 months after tx completed. Side effects reviewed, pt to contact office should one occur. Topical Clindamycin Pregnancy And Lactation Text: This medication is Pregnancy Category B and is considered safe during pregnancy. It is unknown if it is excreted in breast milk. Azelaic Acid Counseling: Patient counseled that medicine may cause skin irritation and to avoid applying near the eyes.  In the event of skin irritation, the patient was advised to reduce the amount of the drug applied or use it less frequently.   The patient verbalized understanding of the proper use and possible adverse effects of azelaic acid.  All of the patient's questions and concerns were addressed. Tazorac Pregnancy And Lactation Text: This medication is not safe during pregnancy. It is unknown if this medication is excreted in breast milk. Aklief counseling:  Patient advised to apply a pea-sized amount only at bedtime and wait 30 minutes after washing their face before applying.  If too drying, patient may add a non-comedogenic moisturizer.  The most commonly reported side effects including irritation, redness, scaling, dryness, stinging, burning, itching, and increased risk of sunburn.  The patient verbalized understanding of the proper use and possible adverse effects of retinoids.  All of the patient's questions and concerns were addressed. Bactrim Pregnancy And Lactation Text: This medication is Pregnancy Category D and is known to cause fetal risk.  It is also excreted in breast milk. Erythromycin Counseling:  I discussed with the patient the risks of erythromycin including but not limited to GI upset, allergic reaction, drug rash, diarrhea, increase in liver enzymes, and yeast infections. Azithromycin Pregnancy And Lactation Text: This medication is considered safe during pregnancy and is also secreted in breast milk. Topical Retinoid Pregnancy And Lactation Text: This medication is Pregnancy Category C. It is unknown if this medication is excreted in breast milk. Winlevi Counseling:  I discussed with the patient the risks of topical clascoterone including but not limited to erythema, scaling, itching, and stinging. Patient voiced their understanding. Benzoyl Peroxide Pregnancy And Lactation Text: This medication is Pregnancy Category C. It is unknown if benzoyl peroxide is excreted in breast milk. Detail Level: Zone High Dose Vitamin A Pregnancy And Lactation Text: High dose vitamin A therapy is contraindicated during pregnancy and breast feeding. Tetracycline Counseling: Patient counseled regarding possible photosensitivity and increased risk for sunburn.  Patient instructed to avoid sunlight, if possible.  When exposed to sunlight, patients should wear protective clothing, sunglasses, and sunscreen.  The patient was instructed to call the office immediately if the following severe adverse effects occur:  hearing changes, easy bruising/bleeding, severe headache, or vision changes.  The patient verbalized understanding of the proper use and possible adverse effects of tetracycline.  All of the patient's questions and concerns were addressed. Patient understands to avoid pregnancy while on therapy due to potential birth defects. Doxycycline Counseling:  Patient counseled regarding possible photosensitivity and increased risk for sunburn.  Patient instructed to avoid sunlight, if possible.  When exposed to sunlight, patients should wear protective clothing, sunglasses, and sunscreen.  The patient was instructed to call the office immediately if the following severe adverse effects occur:  hearing changes, easy bruising/bleeding, severe headache, or vision changes.  The patient verbalized understanding of the proper use and possible adverse effects of doxycycline.  All of the patient's questions and concerns were addressed.

## 2023-11-06 ENCOUNTER — LAB ENCOUNTER (OUTPATIENT)
Dept: LAB | Age: 78
End: 2023-11-06
Attending: INTERNAL MEDICINE
Payer: MEDICARE

## 2023-11-06 ENCOUNTER — NURSE TRIAGE (OUTPATIENT)
Dept: INTERNAL MEDICINE CLINIC | Facility: CLINIC | Age: 78
End: 2023-11-06

## 2023-11-06 DIAGNOSIS — R30.0 DYSURIA: ICD-10-CM

## 2023-11-06 DIAGNOSIS — R30.0 DYSURIA: Primary | ICD-10-CM

## 2023-11-06 LAB
BILIRUB UR QL: NEGATIVE
COLOR UR: YELLOW
GLUCOSE UR-MCNC: NORMAL MG/DL
KETONES UR-MCNC: NEGATIVE MG/DL
LEUKOCYTE ESTERASE UR QL STRIP.AUTO: 500
NITRITE UR QL STRIP.AUTO: NEGATIVE
PH UR: 6.5 [PH] (ref 5–8)
PROT UR-MCNC: NEGATIVE MG/DL
RBC #/AREA URNS AUTO: >10 /HPF
SP GR UR STRIP: 1.01 (ref 1–1.03)
UROBILINOGEN UR STRIP-ACNC: NORMAL
WBC #/AREA URNS AUTO: >50 /HPF
WBC CLUMPS UR QL AUTO: PRESENT /HPF

## 2023-11-06 PROCEDURE — 87186 SC STD MICRODIL/AGAR DIL: CPT

## 2023-11-06 PROCEDURE — 81001 URINALYSIS AUTO W/SCOPE: CPT

## 2023-11-06 PROCEDURE — 87088 URINE BACTERIA CULTURE: CPT

## 2023-11-06 PROCEDURE — 87086 URINE CULTURE/COLONY COUNT: CPT

## 2023-11-06 RX ORDER — SULFAMETHOXAZOLE AND TRIMETHOPRIM 800; 160 MG/1; MG/1
1 TABLET ORAL 2 TIMES DAILY
Qty: 10 TABLET | Refills: 0 | Status: SHIPPED | OUTPATIENT
Start: 2023-11-06 | End: 2023-11-11

## 2023-11-06 NOTE — TELEPHONE ENCOUNTER
Please have patient do the urine. We will put in a prescription for antibiotics. In case she does have a resistant bug and when the blade on treating it. Wipe from front to back. Make sure to urinate after sexual activity. And do not hold urine. Hydrate at least 48 ounces of water daily as a commonest cause of urinary tract infections in women that are postmenopausal as dehydration. Last UTI I am seeing was November 19, 2020 with an organism that was sensitive to the Bactrim which is what I am sending.

## 2023-11-06 NOTE — TELEPHONE ENCOUNTER
Action Requested: Summary for Provider     []  Critical Lab, Recommendations Needed  [x] Need Additional Advice  []   FYI    [x]   Need Orders  [] Need Medications Sent to Pharmacy  []  Other     SUMMARY: Patient reports UTI symptoms x 6 days. Previously sent a [employer] message. Reports dysuria, has to take Azo. Today urine is cloudy. Denies hematuria or fever. Has urgency and burning. Also having some low back pain. No office visits available with Dr Nicki Levy, declines appointment with alternate provider. Asking for urine test and wishes to get Cipro which she used in past.  Dr Nicki Levy, lab order for UA/reflex pended for view. Reason for call: Acute  Dysuria  Onset: 6 days     Spoke with patient,  verified. As summarized above. Bredna Han   to P Em Triage Support (supporting Maya Levy MD)         23  8:15 AM  Dr. Nicki Levy,     I haven't had a urinary problem in years but know when it happens. Can you prescribe Cipro for me? I have my annual check up in a couple of weeks but can't wait that long. Thanks. .. Robert Ragsdale       Reason for Disposition   Age > 50 years    Protocols used: Urination Pain - Female-A-[place]

## 2023-11-21 ENCOUNTER — OFFICE VISIT (OUTPATIENT)
Dept: INTERNAL MEDICINE CLINIC | Facility: CLINIC | Age: 78
End: 2023-11-21
Payer: MEDICARE

## 2023-11-21 VITALS
HEIGHT: 59 IN | WEIGHT: 169.63 LBS | TEMPERATURE: 97 F | DIASTOLIC BLOOD PRESSURE: 71 MMHG | RESPIRATION RATE: 17 BRPM | SYSTOLIC BLOOD PRESSURE: 114 MMHG | HEART RATE: 76 BPM | BODY MASS INDEX: 34.2 KG/M2

## 2023-11-21 DIAGNOSIS — E55.9 VITAMIN D DEFICIENCY: ICD-10-CM

## 2023-11-21 DIAGNOSIS — L40.9 PSORIASIS AND SIMILAR DISORDER: ICD-10-CM

## 2023-11-21 DIAGNOSIS — H43.393 FLOATERS IN VISUAL FIELD, BILATERAL: ICD-10-CM

## 2023-11-21 DIAGNOSIS — E78.00 HYPERCHOLESTEROLEMIA: Primary | ICD-10-CM

## 2023-11-21 DIAGNOSIS — R03.0 ELEVATED BP WITHOUT DIAGNOSIS OF HYPERTENSION: ICD-10-CM

## 2023-11-21 DIAGNOSIS — M85.80 SENILE OSTEOPENIA: ICD-10-CM

## 2023-11-21 DIAGNOSIS — Z71.85 VACCINE COUNSELING: ICD-10-CM

## 2023-11-21 DIAGNOSIS — D50.0 IRON DEFICIENCY ANEMIA DUE TO CHRONIC BLOOD LOSS: ICD-10-CM

## 2023-11-21 DIAGNOSIS — Z12.31 VISIT FOR SCREENING MAMMOGRAM: ICD-10-CM

## 2023-11-21 DIAGNOSIS — L21.9 SEBORRHEIC DERMATITIS: ICD-10-CM

## 2023-11-21 DIAGNOSIS — H25.13 AGE-RELATED NUCLEAR CATARACT OF BOTH EYES: ICD-10-CM

## 2023-11-21 DIAGNOSIS — Z00.00 ENCOUNTER FOR ANNUAL HEALTH EXAMINATION: ICD-10-CM

## 2023-11-21 DIAGNOSIS — K57.30 DIVERTICULOSIS OF COLON: ICD-10-CM

## 2023-11-21 LAB
APPEARANCE: CLEAR
BILIRUBIN: NEGATIVE
GLUCOSE (URINE DIPSTICK): NEGATIVE MG/DL
KETONES (URINE DIPSTICK): NEGATIVE MG/DL
MULTISTIX LOT#: ABNORMAL NUMERIC
NITRITE, URINE: NEGATIVE
OCCULT BLOOD: NEGATIVE
PH, URINE: 6 (ref 4.5–8)
PROTEIN (URINE DIPSTICK): NEGATIVE MG/DL
SPECIFIC GRAVITY: 1.02 (ref 1–1.03)
URINE-COLOR: YELLOW
UROBILINOGEN,SEMI-QN: 0.2 MG/DL (ref 0–1.9)

## 2023-12-26 ENCOUNTER — HOSPITAL ENCOUNTER (OUTPATIENT)
Dept: MAMMOGRAPHY | Age: 78
Discharge: HOME | End: 2023-12-26
Attending: INTERNAL MEDICINE
Payer: MEDICARE

## 2023-12-26 DIAGNOSIS — Z12.31 ENCOUNTER FOR SCREENING MAMMOGRAM FOR BREAST CANCER: ICD-10-CM

## 2023-12-26 PROCEDURE — 77067 SCR MAMMO BI INCL CAD: CPT | Performed by: INTERNAL MEDICINE

## 2023-12-26 PROCEDURE — 77063 BREAST TOMOSYNTHESIS BI: CPT | Performed by: INTERNAL MEDICINE

## 2024-01-02 ENCOUNTER — LAB ENCOUNTER (OUTPATIENT)
Dept: LAB | Facility: HOSPITAL | Age: 79
End: 2024-01-02
Attending: INTERNAL MEDICINE
Payer: MEDICARE

## 2024-01-02 DIAGNOSIS — E55.9 VITAMIN D DEFICIENCY: ICD-10-CM

## 2024-01-02 DIAGNOSIS — E78.00 HYPERCHOLESTEROLEMIA: ICD-10-CM

## 2024-01-02 LAB
ALBUMIN SERPL-MCNC: 4 G/DL (ref 3.2–4.8)
ALBUMIN/GLOB SERPL: 1.8 {RATIO} (ref 1–2)
ALP LIVER SERPL-CCNC: 57 U/L
ALT SERPL-CCNC: 16 U/L
ANION GAP SERPL CALC-SCNC: 2 MMOL/L (ref 0–18)
AST SERPL-CCNC: 16 U/L (ref ?–34)
BILIRUB SERPL-MCNC: 0.5 MG/DL (ref 0.2–1.1)
BUN BLD-MCNC: 23 MG/DL (ref 9–23)
BUN/CREAT SERPL: 31.5 (ref 10–20)
CALCIUM BLD-MCNC: 9 MG/DL (ref 8.7–10.4)
CHLORIDE SERPL-SCNC: 109 MMOL/L (ref 98–112)
CHOLEST SERPL-MCNC: 166 MG/DL (ref ?–200)
CO2 SERPL-SCNC: 26 MMOL/L (ref 21–32)
CREAT BLD-MCNC: 0.73 MG/DL
EGFRCR SERPLBLD CKD-EPI 2021: 84 ML/MIN/1.73M2 (ref 60–?)
FASTING PATIENT LIPID ANSWER: YES
FASTING STATUS PATIENT QL REPORTED: YES
GLOBULIN PLAS-MCNC: 2.2 G/DL (ref 2.8–4.4)
GLUCOSE BLD-MCNC: 105 MG/DL (ref 70–99)
HDLC SERPL-MCNC: 62 MG/DL (ref 40–59)
LDLC SERPL CALC-MCNC: 76 MG/DL (ref ?–100)
NONHDLC SERPL-MCNC: 104 MG/DL (ref ?–130)
OSMOLALITY SERPL CALC.SUM OF ELEC: 288 MOSM/KG (ref 275–295)
POTASSIUM SERPL-SCNC: 4.2 MMOL/L (ref 3.5–5.1)
PROT SERPL-MCNC: 6.2 G/DL (ref 5.7–8.2)
SODIUM SERPL-SCNC: 137 MMOL/L (ref 136–145)
TRIGL SERPL-MCNC: 166 MG/DL (ref 30–149)
VIT D+METAB SERPL-MCNC: 39 NG/ML (ref 30–100)
VLDLC SERPL CALC-MCNC: 26 MG/DL (ref 0–30)

## 2024-01-02 PROCEDURE — 80053 COMPREHEN METABOLIC PANEL: CPT

## 2024-01-02 PROCEDURE — 82306 VITAMIN D 25 HYDROXY: CPT

## 2024-01-02 PROCEDURE — 80061 LIPID PANEL: CPT

## 2024-01-02 PROCEDURE — 36415 COLL VENOUS BLD VENIPUNCTURE: CPT

## 2024-01-03 NOTE — PROGRESS NOTES
CMP Normal (electrolytes,  kidney and liver functions),   Lipid (choilesterol) shows triglycerides are slightly elevated.  Goal is less than 150.  Glycerides are elevated with higher carbs.  Lower carbs helps lower triglycerides.  Recheck in 6 months.  Orders written for future labs.  Vitamin D level looks good.  Continue the same.

## 2024-02-01 ENCOUNTER — APPOINTMENT (OUTPATIENT)
Dept: URBAN - METROPOLITAN AREA CLINIC 244 | Age: 79
Setting detail: DERMATOLOGY
End: 2024-02-02

## 2024-02-01 DIAGNOSIS — L81.4 OTHER MELANIN HYPERPIGMENTATION: ICD-10-CM

## 2024-02-01 DIAGNOSIS — L82.1 OTHER SEBORRHEIC KERATOSIS: ICD-10-CM

## 2024-02-01 DIAGNOSIS — D22 MELANOCYTIC NEVI: ICD-10-CM

## 2024-02-01 PROBLEM — D48.5 NEOPLASM OF UNCERTAIN BEHAVIOR OF SKIN: Status: ACTIVE | Noted: 2024-02-01

## 2024-02-01 PROBLEM — D22.5 MELANOCYTIC NEVI OF TRUNK: Status: ACTIVE | Noted: 2024-02-01

## 2024-02-01 PROCEDURE — 11301 SHAVE SKIN LESION 0.6-1.0 CM: CPT

## 2024-02-01 PROCEDURE — 99213 OFFICE O/P EST LOW 20 MIN: CPT | Mod: 25

## 2024-02-01 PROCEDURE — OTHER SHAVE REMOVAL: OTHER

## 2024-02-01 PROCEDURE — OTHER COUNSELING: OTHER

## 2024-02-01 ASSESSMENT — LOCATION SIMPLE DESCRIPTION DERM
LOCATION SIMPLE: ABDOMEN
LOCATION SIMPLE: UPPER BACK

## 2024-02-01 ASSESSMENT — LOCATION DETAILED DESCRIPTION DERM
LOCATION DETAILED: INFERIOR THORACIC SPINE
LOCATION DETAILED: PERIUMBILICAL SKIN

## 2024-02-01 ASSESSMENT — LOCATION ZONE DERM: LOCATION ZONE: TRUNK
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
## 2024-03-01 ENCOUNTER — LAB ENCOUNTER (OUTPATIENT)
Dept: LAB | Facility: HOSPITAL | Age: 79
End: 2024-03-01
Attending: INTERNAL MEDICINE
Payer: MEDICARE

## 2024-03-01 ENCOUNTER — TELEPHONE (OUTPATIENT)
Dept: INTERNAL MEDICINE CLINIC | Facility: CLINIC | Age: 79
End: 2024-03-01

## 2024-03-01 DIAGNOSIS — R30.0 DYSURIA: Primary | ICD-10-CM

## 2024-03-01 DIAGNOSIS — R30.0 DYSURIA: ICD-10-CM

## 2024-03-01 LAB
BILIRUB UR QL CFM: POSITIVE
SP GR UR REFRACTOMETRY: 1.01 (ref 1–1.03)

## 2024-03-01 PROCEDURE — 81001 URINALYSIS AUTO W/SCOPE: CPT

## 2024-03-01 PROCEDURE — 81015 MICROSCOPIC EXAM OF URINE: CPT

## 2024-03-01 PROCEDURE — 87086 URINE CULTURE/COLONY COUNT: CPT

## 2024-03-01 RX ORDER — CIPROFLOXACIN 500 MG/1
500 TABLET, FILM COATED ORAL 2 TIMES DAILY
Qty: 10 TABLET | Refills: 0 | Status: SHIPPED | OUTPATIENT
Start: 2024-03-01 | End: 2024-03-06

## 2024-03-01 NOTE — TELEPHONE ENCOUNTER
Met patient in hallway on wait a meeting at [place].  Patient complains about dysuria x 48 hours.  History of UTIs.  Denies any nausea vomiting abdominal pain blood in the stool or blood in the urine.  Denies any fevers chills.  I taken some Azo today just alleviate the symptoms.  Denies any vaginal discharge.  Monogamous with .  Orders written for UA patient will do UA after work today at 1 PM.  She will go to the [place] for [facility].  Discussed about wiping front and back separately.  Make sure to urinate after sexual activity.  Discussed also hydrating at least 48 ounces of water a day.  Antibiotics were sent over to the pharmacy after the urine sample was submitted.  Discussed about side effects and use of antibiotics and finish course of antibiotics.  Last urine culture done in November 60 2023 was with E. coli pansensitive.

## 2024-03-05 ENCOUNTER — TELEPHONE (OUTPATIENT)
Dept: INTERNAL MEDICINE CLINIC | Facility: CLINIC | Age: 79
End: 2024-03-05

## 2024-03-05 NOTE — TELEPHONE ENCOUNTER
Per result note, pcp asked that we contact patient to see how she's feeling with antibiotic treatment for UTI. Left a detailed message to home phone (ok per GEORGIANA). Advised to call back and speak with RN. Phone # and hours provided on message left.

## 2024-03-06 NOTE — TELEPHONE ENCOUNTER
SHANNON Dominguez pt was called and she is feeling well no more symptoms all clear.  Per pt \"tell her she is a Jesus Alberto\".

## 2024-04-09 ENCOUNTER — OFFICE VISIT (OUTPATIENT)
Dept: INTERNAL MEDICINE CLINIC | Facility: CLINIC | Age: 79
End: 2024-04-09

## 2024-04-09 ENCOUNTER — NURSE TRIAGE (OUTPATIENT)
Dept: INTERNAL MEDICINE CLINIC | Facility: CLINIC | Age: 79
End: 2024-04-09

## 2024-04-09 VITALS
WEIGHT: 167 LBS | BODY MASS INDEX: 33.67 KG/M2 | HEART RATE: 85 BPM | OXYGEN SATURATION: 96 % | TEMPERATURE: 99 F | SYSTOLIC BLOOD PRESSURE: 118 MMHG | RESPIRATION RATE: 19 BRPM | HEIGHT: 59 IN | DIASTOLIC BLOOD PRESSURE: 66 MMHG

## 2024-04-09 DIAGNOSIS — R50.9 FEVER, UNSPECIFIED FEVER CAUSE: ICD-10-CM

## 2024-04-09 DIAGNOSIS — R09.89 CHEST CONGESTION: ICD-10-CM

## 2024-04-09 DIAGNOSIS — R05.1 ACUTE COUGH: Primary | ICD-10-CM

## 2024-04-09 DIAGNOSIS — R21 RASH: ICD-10-CM

## 2024-04-09 PROCEDURE — 99214 OFFICE O/P EST MOD 30 MIN: CPT | Performed by: INTERNAL MEDICINE

## 2024-04-09 RX ORDER — AZITHROMYCIN 250 MG/1
TABLET, FILM COATED ORAL
Qty: 6 TABLET | Refills: 0 | Status: SHIPPED | OUTPATIENT
Start: 2024-04-09 | End: 2024-04-13

## 2024-04-09 RX ORDER — BENZONATATE 100 MG/1
100 CAPSULE ORAL 3 TIMES DAILY PRN
Qty: 20 CAPSULE | Refills: 0 | Status: SHIPPED | OUTPATIENT
Start: 2024-04-09 | End: 2024-04-16

## 2024-04-09 NOTE — TELEPHONE ENCOUNTER
Pt stated that on Thursdays she started to have a very deep cough.Pt did a home covid test and it was negative. Pt stated that she has a rash on top of her torso but she always gets it when the illness is leaving her body. For the past  2 days pt has been having a temp. Yesterday it as 101.0 today its 100.0. Pt stated that she feels a little better today then yesterday but the cough is very deep and she will like some cough medication. No shortness of breath does have chest pain when coughing and she does have some wheezing in her chest. Pt was inform she will have to be seen. Pt was given a appt to see       Future Appointments   Date Time Provider Department Center   4/9/2024 11:30 AM Elio Briceno MD [id number] [facility] 429   11/19/2024  2:00 PM Maya Thomas MD [id number] [facility] 429       Reason for Disposition   Wheezing is present    Protocols used: Cough-A-OH

## 2024-04-14 NOTE — PROGRESS NOTES
Subjective:     Patient ID: Marisela Elam is a 78 year old female.    HPI    Patient comes in today with complaint of cough has been going on for for 5 days send fever chest congestion also symptoms she gets this rash over her chest and then goes away      History/Other:   Review of Systems   Constitutional:  Positive for fever.   HENT:  Positive for congestion.    Eyes: Negative.    Respiratory:  Positive for cough.    Cardiovascular: Negative.    Gastrointestinal: Negative.    Genitourinary: Negative.    Musculoskeletal: Negative.    Skin:  Positive for rash.   Neurological: Negative.    Psychiatric/Behavioral: Negative.       Current Outpatient Medications   Medication Sig Dispense Refill    azithromycin (ZITHROMAX Z-SYL) 250 MG Oral Tab Take 2 tablets (500 mg total) by mouth daily for 1 day, THEN 1 tablet (250 mg total) daily for 4 days. 6 tablet 0    benzonatate 100 MG Oral Cap Take 1 capsule (100 mg total) by mouth 3 (three) times daily as needed for cough. 20 capsule 0    atorvastatin 20 MG Oral Tab Take 1 tablet (20 mg total) by mouth nightly. 90 tablet 3    lisinopril 10 MG Oral Tab Take 1 tablet (10 mg total) by mouth daily. 90 tablet 3    Vitamin C 500 MG Oral Tab Take 1 tablet (500 mg total) by mouth daily.      Cholecalciferol (VITAMIN D3) 1.25 MG (45806 UT) Oral Cap Take 1 capsule by mouth once a week 12 capsule 0    Betamethasone Dipropionate 0.05 % External Ointment Apply 1 g topically nightly. 50 g 1    ferrous sulfate 325 (65 FE) MG Oral Tab EC Take 1 tablet (325 mg total) by mouth daily with breakfast.       Allergies:  Allergies   Allergen Reactions    Amoxicillin      Other reaction(s): Hives/Skin Rash    Nitrofurantoin Macrocrystal      Other reaction(s): NITROFURANTOIN MACROCRYSTAL    Shingrix [Zoster Vac Recomb Adjuvanted] RASH       Past Medical History:    Allergic rhinitis    Foot fracture, left    Hyperlipidemia    Ovarian ca (HCC)    SIMBA/BSO    Psoriasis      Past Surgical History:    Procedure Laterality Date    Bso, omentectomy w/simba      Colonoscopy      D & c      Hysterectomy      SIMBA/BSO      , ,     Tubal ligation  Not sure      Family History   Problem Relation Age of Onset    Hypertension Father     Diabetes Father     Stroke Father     Diabetes Maternal Grandfather     Cancer Maternal Grandfather         throat    Diabetes Other     Asthma Daughter     Depression Daughter     Psychiatric Daughter     Depression Daughter     Psychiatric Daughter     Glaucoma Neg     Macular degeneration Neg       Social History:   Social History     Socioeconomic History    Marital status:    Tobacco Use    Smoking status: Former     Current packs/day: 0.00     Average packs/day: 1.5 packs/day for 10.0 years (15.0 ttl pk-yrs)     Types: Cigarettes     Start date: 1975     Quit date: 1985     Years since quittin.8    Smokeless tobacco: Never   Substance and Sexual Activity    Alcohol use: Yes     Alcohol/week: 1.0 standard drink of alcohol     Types: 1 Glasses of wine per week     Comment: Socially    Drug use: Never    Sexual activity: Not Currently     Partners: Male   Other Topics Concern    Caffeine Concern Yes     Comment: Coffee, 6 cups per day         Objective:   Physical Exam  Vitals and nursing note reviewed.   Constitutional:       Appearance: She is well-developed.   HENT:      Head: Normocephalic and atraumatic.      Right Ear: External ear normal.      Left Ear: External ear normal.      Nose: Nose normal.   Eyes:      Conjunctiva/sclera: Conjunctivae normal.      Pupils: Pupils are equal, round, and reactive to light.   Cardiovascular:      Rate and Rhythm: Normal rate and regular rhythm.      Heart sounds: Normal heart sounds.   Pulmonary:      Effort: Pulmonary effort is normal.      Breath sounds: Normal breath sounds.   Abdominal:      General: Bowel sounds are normal.      Palpations: Abdomen is soft.   Genitourinary:     Vagina: Normal.    Musculoskeletal:         General: Normal range of motion.      Cervical back: Normal range of motion and neck supple.   Skin:     General: Skin is warm and dry.   Neurological:      Mental Status: She is alert and oriented to person, place, and time.      Deep Tendon Reflexes: Reflexes are normal and symmetric.   Psychiatric:         Behavior: Behavior normal.         Thought Content: Thought content normal.         Judgment: Judgment normal.         Assessment & Plan:   1. Acute cough wet cough medicine will treat with antibiotic take as prescribed let us know if not better   2. Chest congestion as above monitor   3. Fever, unspecified fever cause  Medication for fever   4. Rash hives  As need Benadryl monitor       No orders of the defined types were placed in this encounter.      Meds This Visit:  Requested Prescriptions     Signed Prescriptions Disp Refills    azithromycin (ZITHROMAX Z-SYL) 250 MG Oral Tab 6 tablet 0     Sig: Take 2 tablets (500 mg total) by mouth daily for 1 day, THEN 1 tablet (250 mg total) daily for 4 days.    benzonatate 100 MG Oral Cap 20 capsule 0     Sig: Take 1 capsule (100 mg total) by mouth 3 (three) times daily as needed for cough.       Imaging & Referrals:  None

## 2024-05-02 ENCOUNTER — TELEPHONE (OUTPATIENT)
Dept: INTERNAL MEDICINE CLINIC | Facility: CLINIC | Age: 79
End: 2024-05-02

## 2024-05-02 ENCOUNTER — PATIENT MESSAGE (OUTPATIENT)
Dept: INTERNAL MEDICINE CLINIC | Facility: CLINIC | Age: 79
End: 2024-05-02

## 2024-05-02 RX ORDER — MONTELUKAST SODIUM 10 MG/1
10 TABLET ORAL NIGHTLY
Qty: 7 TABLET | Refills: 0 | Status: SHIPPED | OUTPATIENT
Start: 2024-05-02

## 2024-05-02 NOTE — TELEPHONE ENCOUNTER
From: Marisela Elam  To: Maya Thomas  Sent: 5/2/2024 11:44 AM CDT  Subject: Resistant cough    Dr. Thomas,    About a month ago, I saw Dr. Briceno for a bad cold and cough. I am feeling better but this cough has really got me down. It isn't as bad as originally, but my throat is becoming raw from coughing. Can I possibly get another prescription for the cough medication I had from Dr. Briceno?     Thank you...Marisela Elam

## 2024-05-02 NOTE — TELEPHONE ENCOUNTER
Patient calling ( name and  of patient verified ) in regards to MyChart below.    Patient reports cough is much better since Last office visit 2024  she completed  the Z pack and Benzonatate as directed but remains with dry, \" hacking \" cough, at times  hard to rest at night     Has tried OTC Vicks formula 44 cough  syrup with no relief     Denies fever, no chest pain , no shortness of breath     Patient requesting medication for the cough      Allergies reviewed and pharmacy confirmed  Please advise and thank you.              Marisela Elam   to P Em Triage Support (supporting Maya Thomas MD)       24 11:44 AM  Dr. Thomas,     About a month ago, I saw Dr. Briceno for a bad cold and cough. I am feeling better but this cough has really got me down. It isn't as bad as originally, but my throat is becoming raw from coughing. Can I possibly get another prescription for the cough medication I had from Dr. Briceno?

## 2024-05-02 NOTE — TELEPHONE ENCOUNTER
I did not see her for the cough.  I am not sure if it is an exacerbation like bronchospasm we can try Singulair 10 mg at night for 7 nights and stay away from cold liquids more room temperature liquids.  We can also avoid dairy because that can make cough more.  If she is not getting any better I will need to see her back in.

## 2024-06-21 ENCOUNTER — PATIENT MESSAGE (OUTPATIENT)
Dept: INTERNAL MEDICINE CLINIC | Facility: CLINIC | Age: 79
End: 2024-06-21

## 2024-06-21 ENCOUNTER — NURSE TRIAGE (OUTPATIENT)
Dept: INTERNAL MEDICINE CLINIC | Facility: CLINIC | Age: 79
End: 2024-06-21

## 2024-06-21 NOTE — TELEPHONE ENCOUNTER
.Action Requested: Summary for Provider     []  Critical Lab, Recommendations Needed  [] Need Additional Advice  [x]   FYI    []   Need Orders  [] Need Medications Sent to Pharmacy  []  Other     SUMMARY: Per protocol, patient should be seen in the office within 3 days. Appointment scheduled.    Future Appointments   Date Time Provider Department Center   6/24/2024  1:45 PM Sandra Briceno MD [id number] [place] [id number]   11/19/2024  2:00 PM Maya Thomas MD [id number] [place] [address]   12/27/2024  8:20 AM GEORGE MIJARES RM1 [facility] [place]     Reason for call: Low Back Pain  Onset: 2 Weeks Ago    Patient reports running backwards and fell on her tailbone while playing pickle ball 2 weeks ago. She still cannot bend over. Pain comes and goes. She has been applying BioFreeze and taking ibuprofen for pain which helps. Advised to schedule an appointment for evaluation due to prolonged pain. Care advice given per protocol. Patient verbalized understanding and agreed with plan of care.    Reason for Disposition   MODERATE back pain (e.g., interferes with normal activities) and present > 3 days    Protocols used: Back Pain-A-OH

## 2024-06-23 ENCOUNTER — LAB ENCOUNTER (OUTPATIENT)
Dept: LAB | Facility: HOSPITAL | Age: 79
End: 2024-06-23
Attending: INTERNAL MEDICINE

## 2024-06-23 DIAGNOSIS — E78.00 HYPERCHOLESTEROLEMIA: ICD-10-CM

## 2024-06-23 LAB
CHOLEST SERPL-MCNC: 135 MG/DL (ref ?–200)
FASTING PATIENT LIPID ANSWER: YES
HDLC SERPL-MCNC: 56 MG/DL (ref 40–59)
LDLC SERPL CALC-MCNC: 63 MG/DL (ref ?–100)
NONHDLC SERPL-MCNC: 79 MG/DL (ref ?–130)
TRIGL SERPL-MCNC: 85 MG/DL (ref 30–149)
VLDLC SERPL CALC-MCNC: 13 MG/DL (ref 0–30)

## 2024-06-23 PROCEDURE — 80061 LIPID PANEL: CPT

## 2024-06-23 PROCEDURE — 36415 COLL VENOUS BLD VENIPUNCTURE: CPT

## 2024-06-24 ENCOUNTER — OFFICE VISIT (OUTPATIENT)
Dept: INTERNAL MEDICINE CLINIC | Facility: CLINIC | Age: 79
End: 2024-06-24

## 2024-06-24 VITALS
BODY MASS INDEX: 34.68 KG/M2 | WEIGHT: 172 LBS | HEART RATE: 64 BPM | HEIGHT: 59 IN | SYSTOLIC BLOOD PRESSURE: 138 MMHG | TEMPERATURE: 98 F | DIASTOLIC BLOOD PRESSURE: 85 MMHG | OXYGEN SATURATION: 96 %

## 2024-06-24 DIAGNOSIS — M53.3 SACRAL PAIN: Primary | ICD-10-CM

## 2024-06-24 PROCEDURE — 99213 OFFICE O/P EST LOW 20 MIN: CPT | Performed by: INTERNAL MEDICINE

## 2024-06-24 NOTE — PROGRESS NOTES
Subjective:     Patient ID: Marisela Elam is a 79 year old female.    Fall        History/Other: She came in today complaining of sacral pain according to her she has been pickleball 2 weeks ago when she fell on her buttock.  Since then she is having pain with movement, she is taking ibuprofen for pain.  Review of Systems   Constitutional: Negative.    HENT: Negative.     Respiratory: Negative.     Cardiovascular: Negative.    Gastrointestinal: Negative.    Genitourinary: Negative.    Musculoskeletal:         Sacral pain   Neurological: Negative.    Hematological: Negative.    Psychiatric/Behavioral: Negative.       Current Outpatient Medications   Medication Sig Dispense Refill    atorvastatin 20 MG Oral Tab Take 1 tablet (20 mg total) by mouth nightly. 90 tablet 3    lisinopril 10 MG Oral Tab Take 1 tablet (10 mg total) by mouth daily. 90 tablet 3    Vitamin C 500 MG Oral Tab Take 1 tablet (500 mg total) by mouth daily.      Cholecalciferol (VITAMIN D3) 1.25 MG (00829 UT) Oral Cap Take 1 capsule by mouth once a week 12 capsule 0    Betamethasone Dipropionate 0.05 % External Ointment Apply 1 g topically nightly. 50 g 1    montelukast (SINGULAIR) 10 MG Oral Tab Take 1 tablet (10 mg total) by mouth nightly. Take for 7 nights (Patient not taking: Reported on 2024) 7 tablet 0    ferrous sulfate 325 (65 FE) MG Oral Tab EC Take 1 tablet (325 mg total) by mouth daily with breakfast.       Allergies:  Allergies   Allergen Reactions    Amoxicillin      Other reaction(s): Hives/Skin Rash    Nitrofurantoin Macrocrystal      Other reaction(s): NITROFURANTOIN MACROCRYSTAL    Shingrix [Zoster Vac Recomb Adjuvanted] RASH       Past Medical History:    Allergic rhinitis    Foot fracture, left    Hyperlipidemia    Ovarian ca (HCC)    SIMBA/BSO    Psoriasis      Past Surgical History:   Procedure Laterality Date    Bso, omentectomy w/simba      Colonoscopy      D & c      Hysterectomy      SIMBA/BSO      , ,      Tubal ligation  Not sure      Family History   Problem Relation Age of Onset    Hypertension Father     Diabetes Father     Stroke Father     Diabetes Maternal Grandfather     Cancer Maternal Grandfather         throat    Diabetes Other     Asthma Daughter     Depression Daughter     Psychiatric Daughter     Depression Daughter     Psychiatric Daughter     Glaucoma Neg     Macular degeneration Neg       Social History:   Social History     Socioeconomic History    Marital status:    Tobacco Use    Smoking status: Former     Current packs/day: 0.00     Average packs/day: 1.5 packs/day for 10.0 years (15.0 ttl pk-yrs)     Types: Cigarettes     Start date: 1975     Quit date: 1985     Years since quittin.0    Smokeless tobacco: Never   Substance and Sexual Activity    Alcohol use: Yes     Alcohol/week: 1.0 standard drink of alcohol     Types: 1 Glasses of wine per week     Comment: Socially    Drug use: Never    Sexual activity: Not Currently     Partners: Male   Other Topics Concern    Caffeine Concern Yes     Comment: Coffee, 6 cups per day         Objective:   Physical Exam  Vitals and nursing note reviewed.   Constitutional:       Appearance: Normal appearance.   Cardiovascular:      Rate and Rhythm: Normal rate and regular rhythm.      Pulses: Normal pulses.      Heart sounds: Normal heart sounds.   Pulmonary:      Effort: Pulmonary effort is normal.      Breath sounds: Normal breath sounds.   Musculoskeletal:         General: Normal range of motion.      Cervical back: Normal range of motion and neck supple.   Skin:     General: Skin is warm.   Neurological:      Mental Status: She is alert. Mental status is at baseline.         Assessment & Plan:   1. Sacral pain    Post fall, ibuprofen only as needed discussed risk for gastric ulcer, use Tylenol as needed in between, donut shaped pillow if not better follow-up    No orders of the defined types were placed in this encounter.      Meds  This Visit:  Requested Prescriptions      No prescriptions requested or ordered in this encounter       Imaging & Referrals:  XR SACRUM + COCCYX (MIN 2 VIEWS) (CPT=72220)

## 2024-07-10 RX ORDER — ATORVASTATIN CALCIUM 20 MG/1
20 TABLET, FILM COATED ORAL NIGHTLY
Qty: 90 TABLET | Refills: 3 | Status: SHIPPED | OUTPATIENT
Start: 2024-07-10

## 2024-07-10 NOTE — TELEPHONE ENCOUNTER
Refill passed per [facility] protocol.    Requested Prescriptions   Pending Prescriptions Disp Refills    ATORVASTATIN 20 MG Oral Tab [Pharmacy Med Name: Atorvastatin Calcium 20 Mg Tab Nort] 90 tablet 0     Sig: Take 1 tablet (20 mg total) by mouth nightly.       Cholesterol Medication Protocol Passed - 7/8/2024  1:31 AM        Passed - ALT < 80     Lab Results   Component Value Date    ALT 16 01/02/2024             Passed - ALT resulted within past year        Passed - Lipid panel within past 12 months     Lab Results   Component Value Date    CHOLEST 135 06/23/2024    TRIG 85 06/23/2024    HDL 56 06/23/2024    LDL 63 06/23/2024    VLDL 13 06/23/2024    NONHDLC 79 06/23/2024             Passed - In person appointment or virtual visit in the past 12 mos or appointment in next 3 mos     Recent Outpatient Visits              2 weeks ago Sacral pain    [facility] Sandra Briceno MD    Office Visit    3 months ago Acute cough    [facility] Elio Briceno MD    Office Visit    7 months ago Hypercholesterolemia    [facility] Maya Thomas MD    Office Visit    1 year ago COVID-19    [facility] Harini Elam APRN    Telemedicine    1 year ago Hypercholesterolemia    [facility] aMya Thomas MD    Office Visit          Future Appointments         Provider Department Appt Notes    In 4 months Maya Thomas MD [facility] last px 11-21-22    In 5 months ADO DEXA RM1; ADO DYLLAN RM1 [facility]                          Future Appointments         Provider Department Appt Notes    In 4 months Maya Thomas MD [facility] last px 11-21-22    In 5 months ADO DEXA RM1; ADO DYLLAN RM1 [place]  [facility] [place]             Recent Outpatient Visits              2 weeks ago Sacral pain    [facility], Sandra Brasher MD    Office Visit    3 months ago Acute cough    [facility], Elio Brasher MD    Office Visit    7 months ago Hypercholesterolemia    [facility], Maya Castellanos MD    Office Visit    1 year ago COVID-19    [facility], Harini Xavier APRN    Telemedicine    1 year ago Hypercholesterolemia    [facility], Maya Castellanos MD    Office Visit

## 2024-07-17 RX ORDER — LISINOPRIL 10 MG/1
10 TABLET ORAL DAILY
Qty: 90 TABLET | Refills: 3 | Status: SHIPPED | OUTPATIENT
Start: 2024-07-17

## 2024-07-17 NOTE — TELEPHONE ENCOUNTER
REFILL PASSED PER [facility] PROTOCOLS    Requested Prescriptions   Pending Prescriptions Disp Refills    LISINOPRIL 10 MG Oral Tab [Pharmacy Med Name: Lisinopril 10 Mg Tab Lupi] 90 tablet 0     Sig: Take 1 tablet (10 mg total) by mouth daily.       Hypertension Medications Protocol Passed - 7/13/2024 12:12 PM        Passed - CMP or BMP in past 12 months        Passed - Last BP reading less than 140/90     BP Readings from Last 1 Encounters:   06/24/24 138/85               Passed - In person appointment or virtual visit in the past 12 mos or appointment in next 3 mos     Recent Outpatient Visits              3 weeks ago Sacral pain    [facility] Sandra Briceno MD    Office Visit    3 months ago Acute cough    [facility] Elio Briceno MD    Office Visit    7 months ago Hypercholesterolemia    [facility] Maya Thomas MD    Office Visit    1 year ago COVID-19    [facility] Harini Elam APRN    Telemedicine    1 year ago Hypercholesterolemia    [facility] Maya Thomas MD    Office Visit          Future Appointments         Provider Department Appt Notes    In 4 months Maya Thomas MD [facility] last px 11-21-22    In 5 months ADO DEXA RM1; ADO [facility] RM1 [facility]                     Passed - EGFRCR or GFRNAA > 50     GFR Evaluation  EGFRCR: 84 , resulted on 1/2/2024               Future Appointments         Provider Department Appt Notes    In 4 months Maya Thomas MD [facility] last px 11-21-22    In 5 months ADO DEXA RM1; ADO [facility] RM1 [facility]           Recent Outpatient Visits              3 weeks ago Sacral pain    [facility]  [facility] [place]    Office Visit    3 months ago Acute cough    [facility], Elio Brasher MD    Office Visit    7 months ago Hypercholesterolemia    [facility], Maya Castellanos MD    Office Visit    1 year ago COVID-19    [facility] [place]    Telemedicine    1 year ago Hypercholesterolemia    [facility], Maya Castellanos MD    Office Visit

## 2024-11-19 ENCOUNTER — OFFICE VISIT (OUTPATIENT)
Dept: INTERNAL MEDICINE CLINIC | Facility: CLINIC | Age: 79
End: 2024-11-19

## 2024-11-19 VITALS
OXYGEN SATURATION: 92 % | TEMPERATURE: 99 F | HEIGHT: 60 IN | DIASTOLIC BLOOD PRESSURE: 77 MMHG | BODY MASS INDEX: 32.98 KG/M2 | HEART RATE: 94 BPM | WEIGHT: 168 LBS | SYSTOLIC BLOOD PRESSURE: 134 MMHG

## 2024-11-19 DIAGNOSIS — M85.80 SENILE OSTEOPENIA: ICD-10-CM

## 2024-11-19 DIAGNOSIS — E78.00 HYPERCHOLESTEROLEMIA: ICD-10-CM

## 2024-11-19 DIAGNOSIS — Z71.85 VACCINE COUNSELING: ICD-10-CM

## 2024-11-19 DIAGNOSIS — D50.0 IRON DEFICIENCY ANEMIA DUE TO CHRONIC BLOOD LOSS: ICD-10-CM

## 2024-11-19 DIAGNOSIS — K57.30 DIVERTICULOSIS OF COLON: ICD-10-CM

## 2024-11-19 DIAGNOSIS — Z00.00 ENCOUNTER FOR ANNUAL HEALTH EXAMINATION: ICD-10-CM

## 2024-11-19 DIAGNOSIS — E55.9 VITAMIN D DEFICIENCY: ICD-10-CM

## 2024-11-19 DIAGNOSIS — R03.0 ELEVATED BP WITHOUT DIAGNOSIS OF HYPERTENSION: ICD-10-CM

## 2024-11-19 DIAGNOSIS — H43.393 FLOATERS IN VISUAL FIELD, BILATERAL: ICD-10-CM

## 2024-11-19 DIAGNOSIS — M81.0 AGE-RELATED OSTEOPOROSIS WITHOUT CURRENT PATHOLOGICAL FRACTURE: ICD-10-CM

## 2024-11-19 DIAGNOSIS — L40.9 PSORIASIS AND SIMILAR DISORDER: ICD-10-CM

## 2024-11-19 DIAGNOSIS — L21.9 SEBORRHEIC DERMATITIS: ICD-10-CM

## 2024-11-19 DIAGNOSIS — H25.13 AGE-RELATED NUCLEAR CATARACT OF BOTH EYES: Primary | ICD-10-CM

## 2024-11-19 DIAGNOSIS — Z12.31 VISIT FOR SCREENING MAMMOGRAM: ICD-10-CM

## 2024-11-19 LAB
APPEARANCE: CLEAR
BILIRUBIN: NEGATIVE
GLUCOSE (URINE DIPSTICK): NEGATIVE MG/DL
KETONES (URINE DIPSTICK): NEGATIVE MG/DL
MULTISTIX LOT#: ABNORMAL NUMERIC
NITRITE, URINE: NEGATIVE
OCCULT BLOOD: NEGATIVE
PH, URINE: 7 (ref 4.5–8)
PROTEIN (URINE DIPSTICK): NEGATIVE MG/DL
SPECIFIC GRAVITY: 1.02 (ref 1–1.03)
URINE-COLOR: YELLOW
UROBILINOGEN,SEMI-QN: 0.2 MG/DL (ref 0–1.9)

## 2024-11-19 NOTE — PROGRESS NOTES
HPI:    Patient ID: Marisela Elam is a 79 year old female.    Marisela Elam is a 79 year old female who presents for a complete physical exam.   Marisela Elam is a 79 year old female who presents for a Medicare Assessment.     Chief Complaint   Patient presents with    Annual     Medicare Visit        Labs:   Lab Results   Component Value Date/Time    WBC 6.3 06/27/2023 11:10 AM    HGB 13.7 06/27/2023 11:10 AM    .0 06/27/2023 11:10 AM      Lab Results   Component Value Date/Time     (H) 01/02/2024 07:08 AM     01/02/2024 07:08 AM    K 4.2 01/02/2024 07:08 AM     01/02/2024 07:08 AM    CO2 26.0 01/02/2024 07:08 AM    CREATSERUM 0.73 01/02/2024 07:08 AM    CA 9.0 01/02/2024 07:08 AM    ALB 4.0 01/02/2024 07:08 AM    TP 6.2 01/02/2024 07:08 AM    ALKPHO 57 01/02/2024 07:08 AM    AST 16 01/02/2024 07:08 AM    ALT 16 01/02/2024 07:08 AM    BILT 0.5 01/02/2024 07:08 AM    TSH 2.270 11/18/2021 11:15 AM        Lab Results   Component Value Date/Time    CHOLEST 135 06/23/2024 10:05 AM    HDL 56 06/23/2024 10:05 AM    TRIG 85 06/23/2024 10:05 AM    LDL 63 06/23/2024 10:05 AM    NONHDLC 79 06/23/2024 10:05 AM       No results found for: \"A1C\"   Lab Results   Component Value Date    VITD 39.0 01/02/2024         No recommendations at this time    Allergies:Allergies[1]  Current Outpatient Medications   Medication Sig Dispense Refill    lisinopril 10 MG Oral Tab Take 1 tablet (10 mg total) by mouth daily. 90 tablet 3    atorvastatin 20 MG Oral Tab Take 1 tablet (20 mg total) by mouth nightly. 90 tablet 3    Vitamin C 500 MG Oral Tab Take 1 tablet (500 mg total) by mouth daily.      Betamethasone Dipropionate 0.05 % External Ointment Apply 1 g topically nightly. 50 g 1    montelukast (SINGULAIR) 10 MG Oral Tab Take 1 tablet (10 mg total) by mouth nightly. Take for 7 nights (Patient not taking: Reported on 11/19/2024) 7 tablet 0    Cholecalciferol (VITAMIN D3) 1.25 MG (74626 UT) Oral Cap Take 1  capsule by mouth once a week (Patient not taking: Reported on 2024) 12 capsule 0      Past Medical History:    Allergic rhinitis    Foot fracture, left    Hyperlipidemia    Ovarian ca (HCC)    SIMBA/BSO    Psoriasis      Past Surgical History:   Procedure Laterality Date    Bso, omentectomy w/simba      Colonoscopy      D & c      Hysterectomy      SIMBA/BSO      , ,     Tubal ligation  Not sure      Family History   Problem Relation Age of Onset    Hypertension Father     Diabetes Father     Stroke Father     Diabetes Maternal Grandfather     Cancer Maternal Grandfather         throat    Diabetes Other     Asthma Daughter     Depression Daughter     Psychiatric Daughter     Depression Daughter     Psychiatric Daughter     Glaucoma Neg     Macular degeneration Neg       Social History:  Social History     Socioeconomic History    Marital status:    Tobacco Use    Smoking status: Former     Current packs/day: 0.00     Average packs/day: 1.5 packs/day for 10.0 years (15.0 ttl pk-yrs)     Types: Cigarettes     Start date: 1975     Quit date: 1985     Years since quittin.4    Smokeless tobacco: Never   Vaping Use    Vaping status: Never Used   Substance and Sexual Activity    Alcohol use: Yes     Alcohol/week: 1.0 standard drink of alcohol     Types: 1 Glasses of wine per week     Comment: Socially    Drug use: Never    Sexual activity: Not Currently     Partners: Male   Other Topics Concern    Caffeine Concern Yes     Comment: Coffee, 6 cups per day        History/Other:     Patient Active Problem List   Diagnosis    Hypercholesterolemia    Senile osteopenia    Psoriasis and similar disorder    Seborrheic dermatitis    Diverticulosis of colon    Elevated BP without diagnosis of hypertension    Vitamin D deficiency    Vaccine counseling    Visit for screening mammogram    Iron deficiency anemia due to chronic blood loss    Age-related nuclear cataract of both eyes    Floaters  in visual field, bilateral       GYNE HISTORY:    OB History    Para Term  AB Living   3 3 3 0 0 3   SAB IAB Ectopic Multiple Live Births   0 0 0 0 3        No LMP recorded. Patient has had a hysterectomy. Due to fibroids.     Hx of Pap: all Paps normal          Hypertension  Patient is here for follow up of hypertension. BP at home: 130/70's Check QAM's.   Has been compliant with medications.  Exercise level: trying to do more (goes to gym and balance 2 times a week with weights X 1 hrs.) and has been following low salt diet.  Weight has been down. Don't eat after dinner.   Wt Readings from Last 3 Encounters:   24 168 lb (76.2 kg)   24 172 lb (78 kg)   24 167 lb (75.8 kg)     BP Readings from Last 3 Encounters:   24 134/77   24 138/85   24 118/66     Labs:   Lab Results   Component Value Date/Time     (H) 2024 07:08 AM     2024 07:08 AM    K 4.2 2024 07:08 AM     2024 07:08 AM    CO2 26.0 2024 07:08 AM    CREATSERUM 0.73 2024 07:08 AM    CA 9.0 2024 07:08 AM    AST 16 2024 07:08 AM    ALT 16 2024 07:08 AM    TSH 2.270 2021 11:15 AM        Lab Results   Component Value Date/Time    CHOLEST 135 2024 10:05 AM    HDL 56 2024 10:05 AM    TRIG 85 2024 10:05 AM    LDL 63 2024 10:05 AM    NONHDLC 79 2024 10:05 AM            Tobacco:  She smoked tobacco in the past but quit greater than 12 months ago.  Social History     Tobacco Use   Smoking Status Former    Current packs/day: 0.00    Average packs/day: 1.5 packs/day for 10.0 years (15.0 ttl pk-yrs)    Types: Cigarettes    Start date: 1975    Quit date: 1985    Years since quittin.4   Smokeless Tobacco Never        CAGE Alcohol Screen:   CAGE screening score of 0 on 2024, showing low risk of alcohol abuse.        Patient Care Team:  Maya Thomas MD as PCP - General (Internal  Medicine)  Jamal Womack MD as Consulting Physician (GASTROENTEROLOGY)  Denny Jackson MD as Consulting Physician (DERMATOLOGY)  Leg Pain   The pain is present in the left hip. This is a new problem. The current episode started more than 1 month ago. There has been a history of trauma (6-24. after fell onto bottom.). The problem occurs constantly. The problem has been gradually improving. The quality of the pain is described as aching. The pain is at a severity of 5/10. The pain is moderate. Associated symptoms include an inability to bear weight, a limited range of motion (RLeg not able to flex.) and stiffness. Pertinent negatives include no fever, itching, joint locking, joint swelling, numbness or tingling. The symptoms are aggravated by activity and standing. She has tried acetaminophen (Sees chiropracter and told china gel (menthol and camphor.) for the symptoms. The treatment provided moderate relief. Family history does not include gout or rheumatoid arthritis. Her past medical history is significant for osteoarthritis. There is no history of diabetes, gout or rheumatoid arthritis.       Review of Systems   Constitutional:  Negative for activity change, appetite change, chills, diaphoresis, fatigue, fever and unexpected weight change.   HENT:  Negative for congestion, dental problem, drooling, ear discharge, ear pain, facial swelling, hearing loss, mouth sores, nosebleeds, postnasal drip, rhinorrhea, sinus pressure, sinus pain, sneezing, sore throat, tinnitus, trouble swallowing and voice change.    Eyes:  Negative for photophobia, pain, discharge, redness, itching and visual disturbance.   Respiratory:  Negative for apnea, cough, choking, chest tightness, shortness of breath, wheezing and stridor.    Cardiovascular:  Negative for chest pain, palpitations and leg swelling.   Gastrointestinal:  Negative for abdominal distention, abdominal pain, anal bleeding, blood in stool, constipation, diarrhea,  nausea, rectal pain and vomiting.   Endocrine: Negative for cold intolerance, heat intolerance, polydipsia, polyphagia and polyuria.   Genitourinary:  Negative for decreased urine volume, difficulty urinating, dysuria, flank pain, frequency, hematuria, menstrual problem, pelvic pain, urgency, vaginal bleeding, vaginal discharge and vaginal pain.   Musculoskeletal:  Positive for stiffness. Negative for gout.   Skin:  Negative for itching and rash.   Neurological:  Negative for dizziness, tingling, tremors, seizures, syncope, facial asymmetry, speech difficulty, weakness, light-headedness, numbness and headaches.   Psychiatric/Behavioral:  Negative for agitation, behavioral problems, confusion, decreased concentration, dysphoric mood, hallucinations, self-injury, sleep disturbance and suicidal ideas. The patient is not nervous/anxious and is not hyperactive.    All other systems reviewed and are negative.          Functional Ability/Status:   Marisela Elam has a completely normal functional assessment. See flowsheet for details.        Fall Risk Assessment:   She has been screened for Falls and is High Risk. Fall Prevention information provided to patient in After Visit Summary.    Do you feel unsteady when standing or walking?: (Patient-Rptd) No  Do you worry about falling?: (Patient-Rptd) No  Have you fallen in the past year?: (Patient-Rptd) Yes  How many times have you fallen?: (Patient-Rptd) (P) 1  Were you injured?: (Patient-Rptd) (P) Yes       Medicare Hearing Assessment:   Hearing Screening    Time taken: 11/19/2024  1:48 PM  Entry User: Joselin Jacobs CMA  Screening Method: Finger Rub  Finger Rub Result: Pass         Depression Screening (PHQ-2/PHQ-9): PHQ-2 SCORE: 0  , done 11/19/2024          Cognitive Assessment:   Abnormal  What day of the week is this?: Incorrect  What month is it?: Incorrect  What year is it?: Incorrect  Recall \"Ball\": Correct  Recall \"Flag\": Correct  Recall \"Tree\":  Correct      Supplementary Documentation:     In the past six months, have you lost more than 10 pounds without trying?: (Patient-Rptd) 2 - No  Has your appetite been poor?: (Patient-Rptd) No  Type of Diet: (Patient-Rptd) Balanced  How does the patient maintain a good energy level?: (Patient-Rptd) Other  How would you describe your daily physical activity?: (Patient-Rptd) Light  How would you describe your current health state?: (Patient-Rptd) Good  How do you maintain positive mental well-being?: (Patient-Rptd) Social Interaction;Games;Visiting Friends;Visiting Family  On a scale of 0 to 10, with 0 being no pain and 10 being severe pain, what is your pain level?: (Patient-Rptd) 3 - (Mild)  In the past six months, have you experienced urine leakage?: (Patient-Rptd) 0-No  At any time do you feel concerned for the safety/well-being of yourself and/or your children, in your home or elsewhere?: No  Have you had any immunizations at another office such as Influenza, Hepatitis B, Tetanus, or Pneumococcal?: (Patient-Rptd) No    Visual Acuity:   Right Eye Visual Acuity: Corrected Right Eye Chart Acuity: 20/200   Left Eye Visual Acuity: Corrected Left Eye Chart Acuity: 20/40   Both Eyes Visual Acuity: Corrected Both Eyes Chart Acuity: 20/30   Able To Tolerate Visual Acuity: Yes        PHYSICAL EXAM:   /77 (BP Location: Right arm, Patient Position: Sitting, Cuff Size: adult)   Pulse 94   Temp 98.5 °F (36.9 °C) (Oral)   Ht 5' (1.524 m)   Wt 168 lb (76.2 kg)   SpO2 92%   BMI 32.81 kg/m²   BP Readings from Last 3 Encounters:   11/19/24 134/77   06/24/24 138/85   04/09/24 118/66     Wt Readings from Last 3 Encounters:   11/19/24 168 lb (76.2 kg)   06/24/24 172 lb (78 kg)   04/09/24 167 lb (75.8 kg)      Body mass index is 32.81 kg/m².   Physical Exam  Vitals and nursing note reviewed.   Constitutional:       General: She is not in acute distress.     Appearance: Normal appearance. She is well-developed and  well-groomed. She is not ill-appearing, toxic-appearing or diaphoretic.      Interventions: She is not intubated.  HENT:      Head: Normocephalic and atraumatic.      Right Ear: Tympanic membrane, ear canal and external ear normal. No decreased hearing noted. No laceration, drainage, swelling or tenderness. No middle ear effusion. There is no impacted cerumen. No foreign body. No mastoid tenderness. No PE tube. No hemotympanum. Tympanic membrane is not injected, scarred, perforated, erythematous, retracted or bulging. Tympanic membrane has normal mobility.      Left Ear: Tympanic membrane, ear canal and external ear normal. No decreased hearing noted. No laceration, drainage, swelling or tenderness.  No middle ear effusion. There is no impacted cerumen. No foreign body. No mastoid tenderness. No PE tube. No hemotympanum. Tympanic membrane is not injected, scarred, perforated, erythematous, retracted or bulging. Tympanic membrane has normal mobility.      Nose:      Right Sinus: No maxillary sinus tenderness or frontal sinus tenderness.      Left Sinus: No maxillary sinus tenderness or frontal sinus tenderness.      Mouth/Throat:      Lips: Pink. No lesions.      Mouth: Mucous membranes are moist. No injury, lacerations, oral lesions or angioedema.      Dentition: No dental tenderness, gingival swelling, dental abscesses or gum lesions.      Tongue: No lesions. Tongue does not deviate from midline.      Palate: No mass and lesions.      Pharynx: Oropharynx is clear. Uvula midline. No pharyngeal swelling, oropharyngeal exudate, posterior oropharyngeal erythema or uvula swelling.      Tonsils: No tonsillar exudate or tonsillar abscesses.   Eyes:      General: Lids are normal. Gaze aligned appropriately. No scleral icterus.        Right eye: No foreign body, discharge or hordeolum.         Left eye: No foreign body, discharge or hordeolum.      Extraocular Movements: Extraocular movements intact.      Right eye: Normal  extraocular motion and no nystagmus.      Left eye: Normal extraocular motion and no nystagmus.      Conjunctiva/sclera: Conjunctivae normal.      Right eye: Right conjunctiva is not injected. No chemosis, exudate or hemorrhage.     Left eye: Left conjunctiva is not injected. No chemosis, exudate or hemorrhage.     Pupils: Pupils are equal, round, and reactive to light.   Neck:      Thyroid: No thyroid mass, thyromegaly or thyroid tenderness.      Vascular: Normal carotid pulses. No carotid bruit, hepatojugular reflux or JVD.      Trachea: Trachea and phonation normal. No tracheal tenderness, tracheostomy, abnormal tracheal secretions or tracheal deviation.   Cardiovascular:      Rate and Rhythm: Normal rate and regular rhythm.      Pulses: Normal pulses.           Carotid pulses are 2+ on the right side and 2+ on the left side.       Radial pulses are 2+ on the right side and 2+ on the left side.        Dorsalis pedis pulses are 2+ on the right side and 2+ on the left side.        Posterior tibial pulses are 2+ on the right side and 2+ on the left side.      Heart sounds: Normal heart sounds, S1 normal and S2 normal.   Pulmonary:      Effort: Pulmonary effort is normal. No tachypnea, bradypnea, accessory muscle usage, prolonged expiration, respiratory distress or retractions. She is not intubated.      Breath sounds: Normal breath sounds and air entry. No stridor, decreased air movement or transmitted upper airway sounds. No decreased breath sounds, wheezing, rhonchi or rales.   Chest:      Chest wall: No tenderness.   Breasts:     Breasts are symmetrical.      Right: No swelling, bleeding, inverted nipple, mass, nipple discharge, skin change or tenderness.      Left: No swelling, bleeding, inverted nipple, mass, nipple discharge, skin change or tenderness.   Abdominal:      General: Bowel sounds are normal. There is no distension.      Palpations: Abdomen is soft. Abdomen is not rigid. There is no fluid wave,  hepatomegaly, splenomegaly or mass.      Tenderness: There is no abdominal tenderness. There is no right CVA tenderness, left CVA tenderness, guarding or rebound.   Genitourinary:     Exam position: Supine.   Musculoskeletal:      Cervical back: Neck supple. No tenderness.      Lumbar back: No swelling, edema, deformity, signs of trauma, lacerations, spasms, tenderness or bony tenderness. Normal range of motion. Negative right straight leg raise test and negative left straight leg raise test. No scoliosis.      Right hip: No deformity, lacerations, tenderness, bony tenderness or crepitus. Normal range of motion. Normal strength.      Left hip: No deformity, lacerations, tenderness, bony tenderness or crepitus. Normal range of motion. Normal strength.      Right lower leg: No edema.      Left lower leg: No edema.   Lymphadenopathy:      Head:      Right side of head: No submental, submandibular, preauricular, posterior auricular or occipital adenopathy.      Left side of head: No submental, submandibular, preauricular, posterior auricular or occipital adenopathy.      Cervical: No cervical adenopathy.      Right cervical: No superficial, deep or posterior cervical adenopathy.     Left cervical: No superficial, deep or posterior cervical adenopathy.      Upper Body:      Right upper body: No supraclavicular, axillary or pectoral adenopathy.      Left upper body: No supraclavicular, axillary or pectoral adenopathy.   Skin:     General: Skin is warm and dry.      Coloration: Skin is not pale.      Findings: No erythema or rash.   Neurological:      Mental Status: She is alert and oriented to person, place, and time.   Psychiatric:         Mood and Affect: Mood normal.         Speech: Speech normal.         Behavior: Behavior normal. Behavior is cooperative.              ASSESSMENT/PLAN:     Encounter Diagnoses   Name Primary?    Age-related nuclear cataract of both eyes Stable.    Yes    Diverticulosis of colon No  Sx.    Diverticulosis is the condition of having small pouches protruding from the wall of the colon. These pouches are extremely common among Americans, for example, where about 10 percent of people over age 40, half of those older than 60, and two-thirds over age 80 have  them. Diverticulosis itself is really not a problem, as the pouches themselves are harmless and rarely cause symptoms. However, the situation becomes more serious if the pouches become infected from, for example, stool getting trapped in the pouch. If infection occurs, the condition is called diverticulitis. Diverticulitis is more serious because infection can lead to other problems. Diverticulosis leads to diverticulitis in about 15-20 percent of cases.  Researchers think a diet low in fiber is to blame for a high incidence of diverticulosis. Fiber is important because it helps keep stool soft and bulky so it can pass easily through the colon. Without enough fiber stool becomes hard, which creates pressure in the colon as the muscles strain to move the stool. This pressure is what causes the parts of the wall of the colon to pop out  into pouches.    Most people with diverticulosis don’t know they have it unless they have studies of the colon done for other reasons and the doctor notices the pouches. But some people do have signs like mild cramps, bloating, or constipation. People whose diverticulosis develops into diverticulitis (this is a very rare occurrence) have symptoms of abdominal pain (usually in the lower left side), fever, nausea, vomiting, diarrhea or constipation, and chills. Symptoms of diverticulosis and diverticulitis are similar to other conditions, such as appendicitis, ovarian cyst, peptic ulcer, Crohn’s disease, and irritable bowel syndrome - so the doctor may do tests such as x-rays, ultrasound, or endoscopy to make the right diagnosis. Prompt treatment for diverticulitis is important because infection can cause  complications like abscess (an infected area that can destroy tissue); perforation of the colon, which can let the infection leak out into the abdominal cavity; and peritonitis, an infection in the abdominal cavity that can be very serious and requires immediate medical attention. Treatment usually involves two steps: antibiotics to clear up the infection, and diet restrictions while the colon heals. Antibiotics: The doctor usually prescribes a 7 to 10- day course of antibiotics, and the doctor’s office will probably check with you daily to make sure the infection is not getting worse (if it is, you may need to check into the hospital for more aggressive antibiotic treatment).    Diet therapy: Until the colon heals, you’ll need to be on a low fiber diet. After the infection is gone, the doctor will want you to switch to a high fiber diet. The new diet will not get rid of existing pouches in the colon, but the bulk from the extra fiber will help the stool move  through your system better, which in turn decreases pressure and helps prevent the development of more pouches and protect against future infection. Other medications: Pain relievers and medicines to control intestinal cramping may also be prescribed. People with serious infection, as well as the elderly and those who are imunocompromised or taking corticosteriods, may have to be hospitalized to receive intravenous antibiotics and fluids. Surgery to remove part of the colon may be necessary for people who have frequent infections or who develop severe complications like abscess or perforation of the colon. The best way to prevent diverticulitis is to keep diverticulosis under control. That means eating a high fiber diet - which requires 20 to 35 grams of fiber each day. Fiber is found in grains, vegetables, and fruits.  Also, it is important to drink enough fluids - at least eight glasses - throughout the day to help keep stool soft. You DO NOT need to avoid  nuts, seeds or popcorn as they do NOT increase the risk if diverticulitis. As a matter of fact they are often a good source of fiber and can be helpful.       Elevated BP without diagnosis of hypertension Stable. Careful with diet and excercise at least 30 minutes 3-4 times a week. Check blood pressures at different times on different days. Can purchase own blood pressure monitor. If not, check at local pharmacy. Bake foods more and grill occasionally. Avoid fried foods. No salt. Use other seasonings.         Floaters in visual field, bilateral Stable.        Hypercholesterolemia Check blood 1-4-25 for blood.        Iron deficiency anemia due to chronic blood loss Check blood.        Psoriasis and similar disorder Stable.        Seborrheic dermatitis Stable.        Senile osteopenia Check dexa. 12-29-29. Take calcium 600 every 12 hrs. With vitamin D 400 IU every  12 hrs. Excerise at least 30 minutes 3-4 times a week. May use calcium citrate as opposed to calcium carbonate which may be better absorbed in the setting of PPI use.  The preferred calcium supplement for people at risk of stone formation is calcium citrate because it helps to increase urinary citrate excretion. We recommend a dose of 200-400 mg if dietary calcium cannot be increased.   lifelong physical activity at all ages is strongly endorsed by the [employer]opor[employer]. Exercise recommendations generally should include weight-bearing, muscle-strengthening, and balance training exercises for 30 minutes 5 days per week or 75 minutes twice weekly, often consistent with other general health recommendations.   Weight Bearing  There are two types of osteoporosis exercises that are important for building and maintaining bone density: weight-bearing and muscle-strengthening exercises.  Weight-bearing Exercises  These exercises include activities that make you move against gravity while staying upright. Weight-bearing exercises can be high-impact or  low-impact.  High-impact weight-bearing exercises help build bones and keep them strong. If you have broken a bone due to osteoporosis or are at risk of breaking a bone, you may need to avoid high-impact exercises. If you’re not sure, you should check with your healthcare provider.  Examples of high-impact weight-bearing exercises are:  Dancing   Doing high-impact aerobics   Hiking   Jogging/running   Jumping Rope   Stair climbing   Tennis  Low-impact weight-bearing exercises can also help keep bones strong and are a safe alternative if you cannot do high-impact exercises. Examples of low-impact weight-bearing exercises are:  Using elliptical training machines   Doing low-impact aerobics   Using stair-step machines   Fast walking on a treadmill or outside          Vaccine counseling PCV 20 today. Holding covid booster.        Visit for screening mammogram  Check mammogram. Continue self breast exam every month.         Vitamin D deficiency Check blood 1-4-25.      .  Leg pain. ? Strain. Hold PT and imaging. Improving.         Orders Placed This Encounter   Procedures    Lipid Panel    Comp Metabolic Panel (14)    CBC With Differential With Platelet    Ferritin    Iron And Tibc    Vitamin D    URINALYSIS, AUTO, W/O SCOPE    Prevnar 20 (PCV20) [15852]    Advance Care Planning- 1st 30 minutes       Meds This Visit:  Requested Prescriptions      No prescriptions requested or ordered in this encounter       Imaging & Referrals:  OPHTHALMOLOGY - INTERNAL  PCV20 VACCINE FOR INTRAMUSCULAR USE  XR DEXA BONE DENSITOMETRY (CPT=77080)      RTC 1 yr. for physical.     1. Age-related nuclear cataract of both eyes (Primary)  -     Ophthalmology Referral - In Network  2. Diverticulosis of colon  3. Elevated BP without diagnosis of hypertension  -     URINALYSIS, AUTO, W/O SCOPE  4. Floaters in visual field, bilateral  5. Hypercholesterolemia  -     Lipid Panel; Future; Expected date: 11/22/2024  -     Comp Metabolic Panel (14);  Future; Expected date: 11/22/2024  6. Iron deficiency anemia due to chronic blood loss  Overview:  10-14-14: Preoperative diagnosis: Screening  Postoperative diagnosis: Diverticulosis    Findings:  Digital rectal examination was normal.  The visualized   colonic   mucosa was normal.  There was moderate diverticulosis in the   sigmoid colon.    There was no evidence of ulcers, colitis, angiodysplasia, polyps   or mass   lesions.  Retroflexed view of the rectum revealed internal   Hemorrhoids.  The patient states that upon routine blood tests typically performed every 6 months, she has been found to be anemic.  Iron studies reveal iron deficiency.     The patient describes indigestion with certain food items such as citrus, lemonade and wine.  She will feel things \"coming back up\" or \"sitting there\".  She also notes postprandial abdominal bloating and has been fatigued.     The patient's appetite is \"too good\".  Her weight fluctuates depending on diet by up to 20 pounds at a time.  These weight swings have occurred about 25-30 times in her lifetime.  She denies classic dysphagia.  She has no abdominal pain.  She has a slight tendency towards constipation (infrequent bowel movements) which improves with a combination of apple cider vinegar, honey and water.  She has noted no bleeding.     The patient had a screening colonoscopy performed by Dr. Lui in October 2014 which was normal with the exception of uncomplicated sigmoid colon diverticulosis.  A 10-year screening examination was advised.  Iron deficiency anemia, unspecified iron deficiency anemia type  (primary encounter diagnosis)  In the course of routine health maintenance the patient was found to be iron deficient with a mild anemia.  She has periodic reflux symptoms, takes NSAIDs every 1-2 weeks and has a slight tendency towards constipation.  Her last colonoscopy was in 2014.  We have discussed possible causes of the iron deficiency including chronic GI  blood loss secondary to inflammatory, vascular or neoplastic lesions of the upper or lower digestive tract versus iron malabsorption (celiac disease).  Neoplastic disease is felt to be less likely unless the anemia has been progressive.  The patient will require at least an upper endoscopy.  I am recommending that we repeat the patient's CBC, iron studies and obtain sprue serology.  Decision regarding upper endoscopy versus panendoscopy pending results of the above testing.  Orders:  -     CBC With Differential With Platelet; Future; Expected date: 11/21/2024  -     Ferritin; Future; Expected date: 12/03/2024  -     Iron And Tibc; Future; Expected date: 12/03/2024  7. Psoriasis and similar disorder  8. Seborrheic dermatitis  9. Senile osteopenia  Overview:  Treated [id number] with actonel  Orders:  -     XR DEXA BONE DENSITOMETRY (CPT=77080); Future; Expected date: 11/19/2024  10. Vaccine counseling  11. Visit for screening mammogram  12. Vitamin D deficiency  -     Vitamin D; Future; Expected date: 01/01/2025  13. Age-related osteoporosis without current pathological fracture  -     XR DEXA BONE DENSITOMETRY (CPT=77080); Future; Expected date: 11/19/2024  14. Encounter for annual health examination  -     Advance Care Planning- 1st 30 minutes  Other orders  -     Prevnar 20 (PCV20) [29899]      The patient indicates understanding of these issues and agrees to the plan.  Further testing ordered.  Imaging studies ordered.  Lab work ordered.  Reinforced healthy diet, lifestyle, and exercise.      Return in about 1 year (around 11/19/2025), or if symptoms worsen or fail to improve.    Advanced Directives:   She does have a Living Will but we do NOT have it on file in Epic.    She does NOT have a Power of  for Health Care. [Do you have a healthcare power of ?: (Patient-Rptd) No]  Discussed Advance Care Planning with patient (and family/surrogate if present). Standard forms made available to patient in  After Visit Summary.  16+ minutes was spent with all Advanced Care Planning elements today (up to 30 minutes for CPT 09189)    MD Marisela Duckworth's SCREENING SCHEDULE   Tests on this list are recommended by your physician but may not be covered, or covered at this frequency, by your insurer.   Please check with your insurance carrier before scheduling to verify coverage.   PREVENTATIVE SERVICES FREQUENCY &  COVERAGE DETAILS LAST COMPLETION DATE   Diabetes Screening    Fasting Blood Sugar /  Glucose    One screening every 12 months if never tested or if previously tested but not diagnosed with pre-diabetes   One screening every 6 months if diagnosed with pre-diabetes Lab Results   Component Value Date     (H) 01/02/2024        Cardiovascular Disease Screening    Lipid Panel  Cholesterol  Lipoprotein (HDL)  Triglycerides Covered every 5 years for all Medicare beneficiaries without apparent signs or symptoms of cardiovascular disease Lab Results   Component Value Date    CHOLEST 135 06/23/2024    HDL 56 06/23/2024    LDL 63 06/23/2024    TRIG 85 06/23/2024         Electrocardiogram (EKG)   Covered if needed at Welcome to Medicare, and non-screening if indicated for medical reasons -      Ultrasound Screening for Abdominal Aortic Aneurysm (AAA) Covered once in a lifetime for one of the following risk factors    Men who are 65-75 years old and have ever smoked    Anyone with a family history -     Colorectal Cancer Screening  Covered for ages 50-85; only need ONE of the following:    Colonoscopy   Covered every 10 years    Covered every 2 years if patient is at high risk or previous colonoscopy was abnormal -    No recommendations at this time    Flexible Sigmoidoscopy   Covered every 4 years -    Fecal Occult Blood Test Covered annually -   Bone Density Screening    Bone density screening    Covered every 2 years after age 65 if diagnosed with risk of osteoporosis or estrogen  deficiency.    Covered yearly for long-term glucocorticoid medication use (Steroids) Last Dexa Scan:    XR DEXA BONE DENSITOMETRY (CPT=77080) 12/29/2022      No recommendations at this time   Pap and Pelvic    Pap   Covered every 2 years for women at normal risk; Annually if at high risk -  No recommendations at this time    Chlamydia Annually if high risk -  No recommendations at this time   Screening Mammogram    Mammogram     Recommend annually for all female patients aged 40 and older    One baseline mammogram covered for patients aged 35-39 12/26/2023    No recommendations at this time    Immunizations    Influenza Covered once per flu season  Please get every year 10/08/2024  No recommendations at this time    Pneumococcal Each vaccine (Wizdbbd15 & Lwyfzuzlr08) covered once after 65 Prevnar 13: 07/07/2017    Empydospb16: 11/13/2018     No recommendations at this time    Hepatitis B One screening covered for patients with certain risk factors   -  No recommendations at this time    Tetanus Toxoid Not covered by Medicare Part B unless medically necessary (cut with metal); may be covered with your pharmacy prescription benefits 01/01/1998    Tetanus, Diptheria and Pertusis TD and TDaP Not covered by Medicare Part B -  No recommendations at this time    Zoster Not covered by Medicare Part B; may be covered with your pharmacy  prescription benefits -  No recommendations at this time     Annual Monitoring of Persistent Medications (ACE/ARB, digoxin diuretics, anticonvulsants)    Potassium Annually Lab Results   Component Value Date    K 4.2 01/02/2024         Creatinine   Annually Lab Results   Component Value Date    CREATSERUM 0.73 01/02/2024         BUN Annually Lab Results   Component Value Date    BUN 23 01/02/2024       Drug Serum Conc Annually No results found for: \"DIGOXIN\", \"DIG\", \"VALP\"                     Understanding Advance Care Planning  Advance care planning is the process of deciding one’s own  future medical care. It helps assure that if you can’t speak for yourself, your wishes can still be carried out. The plan is a series of legal documents that note a person’s wishes. The documents vary by state. Advance care planning should be discussed at a regular office visit with your primary care provider before an acute illness. Advance care planning is encouraged when a person has a serious illness that is expected to get worse. It may also be done before major surgery. And it can help you and your family be prepared in case of a major illness or injury. Advance care planning helps with making decisions at these times.     Who will speak on your behalf?  A healthcare proxy is a person who acts as the voice of a patient when the patient can’t speak for himself or herself. The name of this role varies by state. It may be called a Durable Medical Power of  or Durable Power of  for Healthcare. It may be called an agent, surrogate, or advocate. Or it may be called a representative or decision maker. It's an official duty that is identified by a legal document. The document also varies by state.   Why is advance care planning important?   If a person communicates his or her healthcare wishes:   He or she will be given medical care that matches his or her values and goals.  Family members will not be forced to make decisions in a crisis with no guidance.  Creating a plan  Making an advance care plan is often done in 3 steps:   Thinking about one’s wishes. To create an advance care plan, think about what kind of medical treatment you would want if you lose the ability to communicate. Are there any situations in which you would refuse or stop treatment? Are there therapies you would want or not want? And whom do you want to make decisions for you? There are many places to learn more about how to plan for your care. Ask your healthcare provider or  for resources.  Picking a healthcare proxy.  This means choosing a trusted person to speak for you only when you can’t speak for yourself. When you can't make medical decisions, your proxy makes sure the instructions in your advance care plan are followed. A proxy doesn't make decisions based on his or her own opinions. They must put aside those opinions and values if needed, and carry out your wishes.  Filling out the legal documents. There are several kinds of legal documents for advance care planning. Each one tells healthcare providers your wishes. The documents may vary by state. They must be signed and may need to be witnessed or notarized. You can cancel or change them whenever you wish. Depending on your state, the documents may include a Healthcare Proxy form, Living Will, Durable Medical Power of , and Advance Directive.  The family’s role  The best help a family can give is to support their loved one’s wishes. Open and honest communication is vital. Family should express any concerns they have about the patient’s choices while the patient can still make decisions in the event that his or her illness prevents communicating those wishes at a later time.    [employer] last reviewed this educational content on 12/1/2019    © 4458-0833 [employer]. All rights reserved. This information is not intended as a substitute for professional medical care. Always follow your healthcare professional's instructions.          Advance Medical Directive  An advance medical directive is a form that lets you plan ahead for the care you’d want if you could no longer express your wishes. This statement outlines the medical treatment you’d want or names the person you’d wish to make healthcare decisions for you. Be aware that laws vary from state to state, and it may be worthwhile to talk with an .     Writing down your wishes  An advance directive is important whether you’re young or old. Injury or illness can strike at any age.  Decide what is  important to you and the kind of treatment you’d want, or not want to have.  Some states allow only one kind of advance directive. Some let you do both a durable power of  for healthcare and a living will. Some states put both kinds on the same form.    A durable power of  (POA) for healthcare   This form lets you name someone else that you have chosen and trust to speak and make decisions on your behalf.  This person can decide on treatment for you only when you can’t speak for yourself.  You don't need to be at the end of your life. They could speak for you if you were in a coma but were likely to recover.    A living will  This form lets you list the care you want at the end of your life.  A living will applies only if you won’t live without medical treatment. It would apply if you had advanced cancer, a massive stroke, or other serious illness from which you will not recover.  It takes effect only when you can no longer express your wishes yourself.    [employer] last reviewed this educational content on 2/1/2021 © 2000-2021 [employer]. All rights reserved. This information is not intended as a substitute for professional medical care. Always follow your healthcare professional's instructions.          Choosing an Agent  A durable power of  for healthcare is only as good as the person you name to be your agent. Your agent is the person you have chosen to speak and make decisions on your behalf. If this person knows your treatment wishes and is willing to carry them out, you’ll probably be well represented. Be sure to tell your agent what’s important to you.     Who to choose  Here are suggestions for choosing an agent:  You can name a family member, close friend, , , or rabbi.  You should name one person as your agent. Then name one or two alternates. You need a backup person in case your first choice can’t be reached when needed.  Talk with each person  you're thinking of naming as your agent or alternate. Do this before you decide who should carry out your wishes.  Your agent should be ...  A competent adult, age 18 or older  Someone you trust and can talk to about the care you want and what's important to you  Someone who supports your treatment choices    In many states, your agent can’t be ...   Your healthcare provider  An employee of your provider or of a hospital, nursing home, or hospice program where you receive care  Some states have other restrictions on who can be named as an agent for an advance directive.   Be prepared  Tip: It's a good idea to write down your wishes and give a copy to your agent and all others who are involved with your healthcare.   [employer] last reviewed this educational content on 8/1/2021 © 2000-2021 [employer]. All rights reserved. This information is not intended as a substitute for professional medical care. Always follow your healthcare professional's instructions.          Understanding DNR Orders  Do not resuscitate (DNR) orders tell hospital staff not to do potentially life-restoring measures, such as CPR, if you or your loved one's heart and lungs stop working. It allows a natural death, and prevents healthcare staff from artificially reviving a person and placing them on life support. In many states, a DNR order also applies to staff outside the hospital such as in nursing homes and emergency medical services. A DNR order must be written by a healthcare provider. Or in some cases, certain other healthcare workers write it. This can only be done with the person’s or family’s consent. If a person has not written an advance directive, their family will decide on a DNR with the help of the healthcare team.   The person can cancel a DNR order at any time. The healthcare team can answer questions about the DNR form. Have copies of a DNR form are readily available so that your wishes can be faithfully  followed.   Writing a DNR order  When might a DNR order be written? When the person’s health condition is such that, in the case of cardiac arrest, CPR and other resuscitation methods are not desired. This could be because the chance of successful resuscitation is very low. Or it could be because the care plan now focuses on comfort measures instead of life-sustaining measures. Coma and terminal illness are instances when a DNR order might be used.   Irreversible coma  In a coma, a person does not respond to sight, sound, or touch. The heart and lungs could be working, but brain function is damaged due to trauma or disease.   Terminal illness  In the last stages of heart disease, AIDS, cancer, and other illnesses, some people don’t want to prolong their suffering. If recovery isn’t likely and quality of life is poor or getting worse, a person or their family may agree to a DNR order.   DNR orders and hospice care  A hospice program can offer care during the final weeks of life. Hospice programs provide dignity, pain control and comfort care in the home or at special facilities. Hospice does not provide aggressive treatment. In fact, a DNR order will likely be discussed before a person is admitted to hospice. A  or  may be able to help you arrange for hospice support.   Documenting end-of-life wishes  In addition to DNR orders, a person with a serious, life-limiting illness may wish to document their treatment wishes. This is called a POST form or by different names, depending on the state. It's meant to provide a portable medical order to help guide healthcare providers on the specific medical treatments a person wants during a medical emergency. It's meant to complement a DNR order, not replace it. Your healthcare provider can tell you more and help you complete the forms. The form may be called one of these:   MOLST (medical orders for life-sustaining treatment)  POLST (physician orders  for life-sustaining treatment)  MOST (medical orders for scope of treatment)  POST (physician orders for scope of treatment)  TPOPP (transportable physician orders for patient preferences)  [employer] last reviewed this educational content on 7/1/2021 © 2000-2021 [employer]. All rights reserved. This information is not intended as a substitute for professional medical care. Always follow your healthcare professional's instructions.          An Agent’s Role for Durable Power of  for Health Care   It’s impossible to know which medical treatment choices you might face in the future. What if you aren't able to make these decisions for yourself? A durable power of  for healthcare lets you name an agent to speak and carry out your wishes on your behalf. This happens only if you can’t express your wishes yourself.     An agent’s duty  Your agent respects your wishes in the following ways:    Your agent’s duty is to see that your wishes are followed.  If your wishes aren’t known, your agent should try to decide what you want.  Your agent’s choices come before anyone else’s wishes for you.  A durable power of  for healthcare doesn't not give your agent control over your money . Your agent also can’t be made to pay your bills.  Find out what your agent can do  Restrictions on what an agent can and can’t do vary by state. Check your state laws. In most states your agent can:   Choose or refuse life-sustaining and other medical treatment on your behalf  Consent to treatment, and then stop treatment if your condition doesn’t improve  Access and release your medical records  Request an autopsy and donate your organs, unless you’ve stated otherwise in your advance directive  Find out whether your state allows your agent to do the following:   Refuse or withdraw life-enhancing care  Refuse or stop tube feeding or other life-sustaining care--even if you haven’t stated on your advance directive  that you don’t want these treatments  Order sterilization or   [employer] last reviewed this educational content on 2021 [employer]. All rights reserved. This information is not intended as a substitute for professional medical care. Always follow your healthcare professional's instructions.          Being a Healthcare Proxy  A healthcare proxy is someone who represents a person who can’t speak for themself. The name of this role varies by state. It may be called a Durable Medical Power of . It may be called a Durable Power of  for Healthcare. It may be called an agent, surrogate, or advocate. Or it may be called a representative or decision maker. It's an official duty that is noted by a legal document. The document also varies by state. The person must name you as his or her proxy on the document.      A healthcare proxy speaks for another person when he or she is not able to do so. The proxy helps make sure the person’s healthcare wishes are known and followed.     What it means to be a healthcare proxy   Your role as healthcare proxy starts when the person can’t make medical decisions. This assessment can only be made by a licensed doctor. You then make the healthcare decisions as needed. You do this by carrying out the person’s wishes. These wishes are noted in his or her advance care planning documents. These declare what kind of treatment the person wishes to have or not have. You may need to put aside your own values and opinions to carry out the person’s wishes. This may include refusing or stopping life-sustaining treatments.   Documenting end-of-life wishes   As a healthcare proxy, encourage the person to discuss his or her wishes, while they are able. They can do this with their healthcare provider and then document the wishes as a medical order. The provider can help the person complete the form. The forms are known by different names depending on  the state. The form may be called one of these:     MOLST (medical orders for life-sustaining treatment)  POLST (physician orders for life-sustaining treatment)  MOST (medical orders for scope of treatment)  POST (physician orders for scope of treatment)  TPOPP (transportable physician orders for patient preferences)    The form documents the person’s wishes at the end of life. It's not tied to a certain healthcare provider or facility. It's different than a living will. The form is an order written according to state regulations by a healthcare provider. To complete one, the person must express his or her wishes to an advanced healthcare provider. If the person can’t make his or her own decisions, then this is done by the person’s healthcare proxy.   Carrying out your role  Your duties depend on what the person’s advance care planning documents say. Your duties may also depend on state law. In general:   Before accepting a role as a proxy, talk with the person. Be sure you know his or her wishes. Ask questions. This will help you be his or her voice if and when it is needed.  Be sure that the person’s healthcare team knows that you are his or her proxy. Carry a copy of the document and proof of your identity.  Make sure the healthcare team has a copy of the advance care planning documents.  Talk to the healthcare team. Ask questions as often as you need. Stay informed about the person’s condition.  Ask for any help you need to understand the medical situation. Ask about the person’s condition and prognosis. Ask about risks and benefits of tests and treatments. Find out all the facts and options.  Speak on the person’s behalf with the healthcare team when needed.  Talk with family members and keep them informed.  Know your rights. You have the right to ask for information. You can ask for consultations and second opinions. You have the right to request or refuse treatment for the person. You may be able to review  his or her medical chart. You can authorize the person’s transfer to another facility. You can also request a new healthcare provider for him or her. If you are not sure what your rights are at any time, ask a .  When it’s time to make decisions  If the person’s wishes are clear in the advance care plan documents, ask for them to be carried out as noted. If they are not clear, talk with the healthcare team. Listen to the team’s recommendations. Talk with a  or counselor. It may be hard for you to make a decision at times. You may feel sad or upset about a decision. Being a healthcare proxy is not an easy role. But it's an important one. Remember that the person trusts you to carry out his or her wishes.   If you need help  Ask the healthcare team if you have trouble with a decision. The healthcare team will help you.  Encourage the person you are helping to have a conversation with their provider about their end-of-life wishes. The provider can help them fill out the form.  You may need help in resolving family conflicts. Ask the hospital or clinic , ethics consultant, or a  for help.  If you are having trouble talking with the healthcare team while the person is in the hospital, reach out to the patient relations department. Or ask to speak to the hospital ombudsman or ethics committee.    [employer] last reviewed this educational content on 9/1/2019    © 4912-2762 [employer]. All rights reserved. This information is not intended as a substitute for professional medical care. Always follow your healthcare professional's instructions.          Life Support  If you understand how specific treatments may affect your quality of life, you can decide which ones you’d choose or refuse. You may want to talk to your healthcare provider about the possible benefits and risks of treatments. The chance of good results from these therapies varies based on  each individual clinical situation and can be very hard to predict. Medical treatment, if your life is in danger, falls into 3 main categories.     Life supporting  This care keeps your heart and lungs going when they can no longer work on their own.  CPR restarts your heart and lungs if they stop working.  A respirator (or ventilator) keeps you breathing. Air is pumped into your lungs through a tube that’s put into your windpipe.    Life sustaining  This care keeps you alive longer when you have an illness that can’t be cured.  Tube feeding or TPN (total parenteral nutrition) provides food and fluids through a tube or IV (intravenous). It is given if you can’t chew or swallow on your own.  Dialysis is a kidney machine that cleans your blood when your kidneys can no longer work on their own.    Life enhancing  This care controls pain and discomfort, such as nausea or trouble breathing. This type of care is not designed to prolong your life, but to enhance comfort and quality of life. Nothing is done to keep you alive longer.  Hospice care is comfort care. It might provide food and fluids by mouth or help with bathing. Hospice care is given during the last stages of a terminal illness.  Strong pain medicine can be given to help keep you comfortable.    Do Not Resuscitate (DNR)  Would you want CPR if your heart stops while you’re a patient in a hospital or nursing home? If not, talk to your healthcare provider about issuing a DNR (Do-Not-Resuscitate) order.   DNRs and advance directives may not apply during anesthesia, in emergency rooms, or when emergency medical teams respond to a 911 call. Ask your healthcare provider how you can make sure your wishes will be followed. Also, a DNR will not prevent you from getting other kinds of needed medical care such as treatment for pain, or bleeding.   [employer] last reviewed this educational content on 8/1/2021    © 3848-0497 [employer]. All rights reserved.  This information is not intended as a substitute for professional medical care. Always follow your healthcare professional's instructions.          Stopping Life-Sustaining Treatments  Certain treatments can help sustain life when you have a serious illness. But as your illness progresses, there may come a time when these treatments are no longer a benefit. Decisions must then be made whether to continue or stop these treatments. This can be a hard task for you and your loved ones, but know that you’re not alone. Your healthcare provider and healthcare team will guide you through these treatment decisions. They will also help answer any questions you may have.     What are life-sustaining treatments?  Life-sustaining treatments help keep you alive if a vital body function fails. These treatments can include CPR and the use of machines to help with heart, lung, or kidney function. They can also include the use of tubes to deliver food, fluids, blood, and medicines to the body. Blood transfusions and antibiotics are also types of life-sustaining treatments.   What happens if life-sustaining treatments are continued?  These treatments can help extend your life. But they will not cure your illness. If you are near the end of your life, you may find it hard to handle the side effects and problems that can occur with these treatments. In this case, the treatments may be too much of a burden on your body. They may cause more harm than good. They may also disrupt the natural dying process and prolong suffering.   What happens if life-sustaining treatments are stopped?  If these treatments are stopped, the focus of treatment will shift to comfort care. This involves measures to control pain and other symptoms you may have. These measures are not meant to cure your illness or help you live longer. Instead, they are meant to improve your quality of life during the time you have left. If you are in the end stage of your  illness, you may be referred to hospice by your healthcare provider. Hospice provides end-of-life care. This includes emotional, spiritual, and social support for you and your loved ones.   How do I decide whether to stop life-sustaining treatments?  Your healthcare provider and healthcare team will talk with you about the specific treatments that are part of your care plan. If you want, you may include family and friends in these meetings. Here are some questions to think about or ask your healthcare team:   Is there is a chance that my illness will improve? Or will it continue to get worse?  What are my goals of care? Do I want to extend the time I have left? Or do I want to focus my care on comfort and managing symptoms?  How will stopping or continuing these treatments affect my health? Will they enhance my comfort and quality of life? Or will they cause more problems?  Consider your own values or [place]. Also ask for advice from those who share your values.  How do I state my decisions about life-sustaining treatments?  Once you’ve made your decision to continue or stop specific treatments, you can tell your healthcare provider directly. It's best to also put your treatment wishes in writing with advance directives. These are legal forms related to healthcare decisions. Laws about advance directives vary from state to state. Ask your healthcare provider about what forms are needed to make sure your wishes will be followed. Some common forms include:   A durable power of  for healthcare or a healthcare proxy form.  This form lets you name a person to make treatment decisions for you when you can’t. This person is often called a healthcare proxy, medical or healthcare power of , or agent.  A living will.  This form tells others the kinds of treatment you want or don’t want if you become too ill or injured to speak for yourself.  Orders for life-sustaining treatments.  These are actual healthcare  provider's orders that must be followed by other medical providers. The form belongs to you, not to the healthcare provider or hospital.). These are legal forms obtained from your healthcare provider or hospital that document your wishes. The forms are known by different names depending on the state. Common names include:  MOLST (medical orders for life-sustaining treatment)  POLST (physician orders for life-sustaining treatment)  MOST (medical orders for scope of treatment)  POST (physician orders for scope of treatment)  TPOPP (transportable physician orders for patient preferences)     Keep in mind that you can change or cancel an advance directive at any time. Make it a practice to review your decisions each time there is a change in your health or goals of care. Also be sure to tell your healthcare proxy and loved ones of any changes in your decisions.   Deciding whether to stop life-sustaining treatments for a loved one   The decision to stop treatment ideally is made with the person’s consent. If the person is not capable of making decisions and has no advance directive, the decision falls to the person’s healthcare proxy or other adult. If you need to make a decision about stopping treatment for a loved one, start by talking to his or her healthcare provider. Review the goals of care and the benefits and burdens of specific treatments on your loved one’s health. Also think about your loved one’s wishes and values. If needed, seek advice from other healthcare team members, like a  or .   [place] last reviewed this educational content on 12/1/2019    © 0394-2236 [employer]. All rights reserved. This information is not intended as a substitute for professional medical care. Always follow your healthcare professional's instructions.  Advance Care Planning done today:   She does have a Living Will but we do NOT have it on file in Epic.    She does NOT have a Power of   for Health Care. [Do you have a healthcare power of ?: (Patient-Rptd) No]     Discussed Advance Care Planning with patient and patient declined resource information.  16+ minutes was spent with all Advanced Care Planning elements today (up to 30 minutes for CPT 12525)  Advance care planning including the explanation and discussion of advance directives standard forms performed Face to Face with patient and Family/surrogate (if present), and forms available to patient in AVS         [1]   Allergies  Allergen Reactions    Amoxicillin      Other reaction(s): Hives/Skin Rash    Nitrofurantoin Macrocrystal      Other reaction(s): NITROFURANTOIN MACROCRYSTAL    Shingrix [Zoster Vac Recomb Adjuvanted] RASH

## 2024-11-19 NOTE — PATIENT INSTRUCTIONS
ASSESSMENT/PLAN:     Encounter Diagnoses   Name Primary?    Age-related nuclear cataract of both eyes Stable.    Yes    Diverticulosis of colon No Sx.  Diverticulosis is the condition of having small pouches protruding from the wall of the colon. These pouches are extremely common among Americans, for example, where about 10 percent of people over age 40, half of those older than 60, and two-thirds over age 80 have  them. Diverticulosis itself is really not a problem, as the pouches themselves are harmless and rarely cause symptoms. However, the situation becomes more serious if the pouches become infected from, for example, stool getting trapped in the pouch. If infection occurs, the condition is called diverticulitis. Diverticulitis is more serious because infection can lead to other problems. Diverticulosis leads to diverticulitis in about 15-20 percent of cases.  Researchers think a diet low in fiber is to blame for a high incidence of diverticulosis. Fiber is important because it helps keep stool soft and bulky so it can pass easily through the colon. Without enough fiber stool becomes hard, which creates pressure in the colon as the muscles strain to move the stool. This pressure is what causes the parts of the wall of the colon to pop out  into pouches.    Most people with diverticulosis don’t know they have it unless they have studies of the colon done for other reasons and the doctor notices the pouches. But some people do have signs like mild cramps, bloating, or constipation. People whose diverticulosis develops into diverticulitis (this is a very rare occurrence) have symptoms of abdominal pain (usually in the lower left side), fever, nausea, vomiting, diarrhea or constipation, and chills. Symptoms of diverticulosis and diverticulitis are similar to other conditions, such as appendicitis, ovarian cyst, peptic ulcer, Crohn’s disease, and irritable bowel syndrome - so the doctor may do tests such as  x-rays, ultrasound, or endoscopy to make the right diagnosis. Prompt treatment for diverticulitis is important because infection can cause complications like abscess (an infected area that can destroy tissue); perforation of the colon, which can let the infection leak out into the abdominal cavity; and peritonitis, an infection in the abdominal cavity that can be very serious and requires immediate medical attention. Treatment usually involves two steps: antibiotics to clear up the infection, and diet restrictions while the colon heals. Antibiotics: The doctor usually prescribes a 7 to 10- day course of antibiotics, and the doctor’s office will probably check with you daily to make sure the infection is not getting worse (if it is, you may need to check into the hospital for more aggressive antibiotic treatment).    Diet therapy: Until the colon heals, you’ll need to be on a low fiber diet. After the infection is gone, the doctor will want you to switch to a high fiber diet. The new diet will not get rid of existing pouches in the colon, but the bulk from the extra fiber will help the stool move  through your system better, which in turn decreases pressure and helps prevent the development of more pouches and protect against future infection. Other medications: Pain relievers and medicines to control intestinal cramping may also be prescribed. People with serious infection, as well as the elderly and those who are imunocompromised or taking corticosteriods, may have to be hospitalized to receive intravenous antibiotics and fluids. Surgery to remove part of the colon may be necessary for people who have frequent infections or who develop severe complications like abscess or perforation of the colon. The best way to prevent diverticulitis is to keep diverticulosis under control. That means eating a high fiber diet - which requires 20 to 35 grams of fiber each day. Fiber is found in grains, vegetables, and  fruits.  Also, it is important to drink enough fluids - at least eight glasses - throughout the day to help keep stool soft. You DO NOT need to avoid nuts, seeds or popcorn as they do NOT increase the risk if diverticulitis. As a matter of fact they are often a good source of fiber and can be helpful.       Elevated BP without diagnosis of hypertension Stable. Careful with diet and excercise at least 30 minutes 3-4 times a week. Check blood pressures at different times on different days. Can purchase own blood pressure monitor. If not, check at local pharmacy. Bake foods more and grill occasionally. Avoid fried foods. No salt. Use other seasonings.         Floaters in visual field, bilateral Stable.        Hypercholesterolemia Check blood 1-4-25 for blood.        Iron deficiency anemia due to chronic blood loss Check blood.        Psoriasis and similar disorder Stable.        Seborrheic dermatitis Stable.        Senile osteopenia Check dexa. 12-29-29. Take calcium 600 every 12 hrs. With vitamin D 400 IU every  12 hrs. Excerise at least 30 minutes 3-4 times a week. May use calcium citrate as opposed to calcium carbonate which may be better absorbed in the setting of PPI use.  The preferred calcium supplement for people at risk of stone formation is calcium citrate because it helps to increase urinary citrate excretion. We recommend a dose of 200-400 mg if dietary calcium cannot be increased.   lifelong physical activity at all ages is strongly endorsed by the [employer]opor[employer]. Exercise recommendations generally should include weight-bearing, muscle-strengthening, and balance training exercises for 30 minutes 5 days per week or 75 minutes twice weekly, often consistent with other general health recommendations.   Weight Bearing  There are two types of osteoporosis exercises that are important for building and maintaining bone density: weight-bearing and muscle-strengthening exercises.  Weight-bearing  Exercises  These exercises include activities that make you move against gravity while staying upright. Weight-bearing exercises can be high-impact or low-impact.  High-impact weight-bearing exercises help build bones and keep them strong. If you have broken a bone due to osteoporosis or are at risk of breaking a bone, you may need to avoid high-impact exercises. If you’re not sure, you should check with your healthcare provider.  Examples of high-impact weight-bearing exercises are:  Dancing   Doing high-impact aerobics   Hiking   Jogging/running   Jumping Rope   Stair climbing   Tennis  Low-impact weight-bearing exercises can also help keep bones strong and are a safe alternative if you cannot do high-impact exercises. Examples of low-impact weight-bearing exercises are:  Using elliptical training machines   Doing low-impact aerobics   Using stair-step machines   Fast walking on a treadmill or outside          Vaccine counseling PCV 20 today. Holding covid booster.        Visit for screening mammogram  Check mammogram. Continue self breast exam every month.         Vitamin D deficiency Check blood 1-4-25.      .  Leg pain. ? Strain. Hold PT and imaging. Improving.             General Neck and Back Pain    Both neck and back pain are usually caused by injury to the muscles or ligaments of the spine. Sometimes the disks that separate each bone of the spine may cause pain by pressing on a nearby nerve. Back and neck pain may appear after a sudden twisting or bending force (such as in a car accident), or sometimes after a simple awkward movement. In either case, muscle spasm is often present and adds to the pain.  Acute neck and back pain usually gets better in 1 to 2 weeks. Pain related to disk disease, arthritis in the spinal joints or spinal stenosis (narrowing of the spinal canal) can become chronic and last for months or years.  Back and neck pain are common problems. Most people feel better in 1 or 2 weeks, and  most of the rest in 1 to 2 months. Most people can remain active.  People have and describe pain differently.  Pain can be sharp, stabbing, shooting, aching, cramping, or burning  Movement, standing, bending, lifting, sitting, or walking may worsen the pain  Pain can be localized to one spot or area, or it can be more generalized  Pain can spread or radiate upwards, downwards, to the front, or go down your arms  Muscle spasm may occur.  Most of the time mechanical problems with the muscles or spine cause the pain. it is usually caused by an injury, whether known or not, to the muscles or ligaments. While illnesses can cause back pain, it is usually not caused by a serious illness. Pain is usually related to physical activity, whether sports, exercise, work, or normal activity. Sometimes it can occur without an identifiable cause. This can happen simply by stretching or moving wrong, without noting pain at the time. Other causes include:  Overexertion, lifting, pushing, pulling incorrectly or too aggressively.  Sudden twisting, bending or stretching from an accident (car or fall), or accidental movement.  Poor posture  Poor conditioning, lack of regular exercise  Spinal disc disease or arthritis  Stress  Pregnancy, or illness like appendicitis, bladder or kidney infection, pelvic infections   Home care  For neck pain: Use a comfortable pillow that supports the head and keeps the spine in a neutral position. The position of the head should not be tilted forward or backward.  When in bed, try to find a position of comfort. A firm mattress is best. Try lying flat on your back with pillows under your knees. You can also try lying on your side with your knees bent up towards your chest and a pillow between your knees.  At first, do not try to stretch out the sore spots. If there is a strain, it is not like the good soreness you get after exercising without an injury. In this case, stretching may make it worse.  Don't sit  for long periods, as in long car rides or other travel. This puts more stress on the lower back than standing or walking.  During the first 24 to 72 hours after an injury, apply an ice pack to the painful area for 20 minutes and then remove it for 20 minutes over a period of 60 to 90 minutes or several times a day.   You can alternate ice and heat therapies. Talk with your healthcare provider about the best treatment for your back or neck pain. As a safety precaution, do not use a heating pad at bedtime. Sleeping with a heating pad can lead to skin burns or tissue damage.  Therapeutic massage can help relax the back and neck muscles without stretching them.  Be aware of safe lifting methods and do not lift anything over 15 pounds until all the pain is gone.  Medicines  Talk to your healthcare provider before using medicine, especially if you have other medical problems or are taking other medicines.  You may use over-the-counter medicine to control pain, unless another pain medicine was prescribed. If you have chronic conditions like diabetes, liver or kidney disease, stomach ulcers,  gastrointestinal bleeding, or are taking blood thinner medicines.  Be careful if you are given pain medicines, narcotics, or medicine for muscle spasm. They can cause drowsiness, and can affect your coordination, reflexes, and judgment. Do not drive or operate heavy machinery.  Follow-up care  Follow up with your healthcare provider, or as advised. Physical therapy or further tests may be needed.  If X-rays were taken, you will be notified of any new findings that may affect your care.  Call 911  Call 911 if any of the following occur:  Trouble breathing  Confusion  Very drowsy or trouble awakening  Fainting or loss of consciousness  Rapid or very slow heart rate  Loss of bowel or bladder control  When to seek medical advice  Call your healthcare provider right away if any of these occur:  Pain becomes worse or spreads into your arms  or legs  Weakness, numbness or pain in one or both arms or legs  Numbness in the groin area  Difficulty walking  Fever of 100.4ºF (38ºC) or higher, or as directed by your healthcare provider  Date Last Reviewed: 7/1/2016 © 2000-2019 [employer]. [address] [place] [id number]. All rights reserved. This information is not intended as a substitute for professional medical care. Always follow your healthcare professional's instructions.        Back Care Tips    Caring for your back  These are things you can do to prevent a recurrence of acute back pain and to reduce symptoms from chronic back pain:  Stay at a healthy weight. If you are overweight, losing weight will help most types of back pain.  Exercise is an important part of recovery from most types of back pain. The muscles behind and in front of the spine support the back. This means strengthening both the back muscles and the abdominal muscles will provide better support for your spine.   Swimming and brisk walking are good overall exercises to improve your fitness level.  Practice safe lifting methods (see below).  Practice good posture when sitting, standing, and walking. Don't sit for a long time. This puts more stress on the lower back than standing or walking.  Wear quality shoes with good arch support. Foot and ankle alignment can affect back symptoms. Don't wear high heels.  Therapeutic massage can help relax the back muscles without stretching them.  During the first 24 to 72 hours after an acute injury or flare-up of chronic back pain, put an ice pack on the painful area for 20 minutes and then remove it for 20 minutes. Do thisover a period of 60 to 90 minutes, or several times a day. As a safety precaution, don't use a heating pad at bedtime. Sleeping on a heating pad can lead to skin burns or tissue damage.  You can alternate using ice and heat.  Medicines  Talk with your healthcare provider before using medicines, especially  if you have other health problems or are taking other medicines.  You may use over-the-counter medicines, such as acetaminophen, ibuprofen, or naprosyn to control pain, unless your healthcare provider prescribed other pain medicine. Talk with your healthcare provider before taking any medicines if you have a chronic condition such ass diabetes, liver or kidney disease, stomach ulcers, or digestive bleeding, or are taking blood thinners.  Be careful if you are given prescription pain medicines, opioids, or medicine for muscle spasm. They can cause drowsiness, and affect your coordination, reflexes, and judgment. Don't drive or operate heavy machinery while taking these types of medicines. Take prescription pain medicine only as prescribed by your healthcare provider.  Lumbar stretch  This simple stretch will help relax muscle spasm and keep your back more limber. If exercise makes your back pain worse, don’t do it.  Lie on your back with your knees bent and both feet on the ground.  Slowly raise your left knee to your chest as you flatten your lower back against the floor. Hold for 5 seconds.  Relax and repeat the exercise with your right knee.  Do 10 of these exercises for each leg.  Safe lifting method  Don’t bend over at the waist to lift an object off the floor.  Instead, bend your knees and hips in a squat.   Keep your back and head upright  Hold the object close to your body, directly in front of you.  Straighten your legs to lift the object.   Lower the object to the floor in the reverse fashion.  If you must slide something across the floor, push it.    Posture tips  Sitting  Sit in chairs with straight backs or low-back support. Keep your knees lower than your hips, with your feet flat on the floor.  When driving, sit up straight. Adjust the seat forward so you are not leaning toward the steering wheel.  A small pillow or rolled towel behind your lower back may help if you are driving long distances.    Standing  When standing for long periods, shift most of your weight to one leg at a time. Switch legs every few minutes.   Sleeping  The best way to sleep is on your side with your knees bent. Put a low pillow under your head to support your neck in a neutral spine position. Don't use thick pillows that bend your neck to one side. Put a pillow between your legs to further relax your lower back. If you sleep on your back, put pillows under your knees to support your legs in a slightly flexed position. Use a firm mattress. If your mattress sags, replace it, or use a 1/2-inch plywood board under the mattress to add support.  Follow-up care  Follow up with your healthcare provider, or as advised.  If X-rays, a CT scan or an MRI scan were taken, they may be reviewed by a radiologist. You will be told of any new findings that may affect your care.  Call 911  Call 911 if any of the following occur:  Trouble breathing  Confusion  Very drowsy  Fainting or loss of consciousness  Rapid or very slow heart rate  Loss of  bowel or bladder control  When to seek medical advice  Call your healthcare provider right away if any of the following occur:  Pain becomes worse or spreads to your arms or legs  Weakness or numbness in one or both arms or legs  Numbness in the groin area  Date Last Reviewed: 6/1/2016  © 0427-3466 [employer]. [address] [place] [id number]. All rights reserved. This information is not intended as a substitute for professional medical care. Always follow your healthcare professional's instructions.        Exercises to Strengthen Your Lower Back  Strong lower back and abdominal muscles work together to support your spine. The exercises below will help strengthen the lower back. It is important that you begin exercising slowly and increase levels gradually.  Always begin any exercise program with stretching. If you feel pain while doing any of these exercises, stop and talk to your  doctor about a more specific exercise program that better suits your condition.   Low back stretch  The point of stretching is to make you more flexible and increase your range of motion. Stretch only as much as you are able. Stretch slowly. Do not push your stretch to the limit. If at any point you feel pain while stretching, this is your (temporary) limit.  Lie on your back with your knees bent and both feet on the ground.  Slowly raise your left knee to your chest as you flatten your lower back against the floor. Hold for 5 seconds.  Relax and repeat the exercise with your right knee.  Do 10 of these exercises for each leg.  Repeat hugging both knees to your chest at the same time.  Building lower back strength  Start your exercise routine with 10 to 30 minutes a day, 1 to 3 times a day.  Initial exercises  Lying on your back:  Ankle pumps: Move your foot up and down, towards your head, and then away. Repeat 10 times with each foot.  Heel slides: Slowly bend your knee, drawing the heel of your foot towards you. Then slide your heel/foot from you, straightening your knee. Do not lift your foot off the floor (this is not a leg lift).  Abdominal contraction: Bend your knees and put your hands on your stomach. Tighten your stomach muscles. Hold for 5 seconds, then relax. Repeat 10 times.  Straight leg raise: Bend one leg at the knee and keep the other leg straight. Tighten your stomach muscles. Slowly lift your straight leg 6 to 12 inches off the floor and hold for up to 5 seconds. Repeat 10 times on each side.  Standing:  Wall squats: Stand with your back against the wall. Move your feet about 12 inches away from the wall. Tighten your stomach muscles, and slowly bend your knees until they are at about a 45 degree angle. Do not go down too far. Hold about 5 seconds. Then slowly return to your starting position. Repeat 10 times.  Heel raises: Stand facing the wall. Slowly raise the heels of your feet up and down,  while keeping your toes on the floor. If you have trouble balancing, you can touch the wall with your hands. Repeat 10 times.  More advanced exercises  When you feel comfortable enough, try these exercises.  Kneeling lumbar extension: Begin on your hands and knees. At the same time, raise and straighten your right arm and left leg until they are parallel to the ground. Hold for 2 seconds and come back slowly to a starting position. Repeat with left arm and right leg, alternating 10 times.  Prone lumbar extension: Lie face down, arms extended overhead, palms on the floor. At the same time, raise your right arm and left leg as high as comfortably possible. Hold for 10 seconds and slowly return to start. Repeat with left arm and right leg, alternating 10 times. Gradually build up to 20 times. (Advanced: Repeat this exercise raising both arms and both legs a few inches off the floor at the same time. Hold for 5 seconds and release.)  Pelvic tilt: Lie on the floor on your back with your knees bent at 90 degrees. Your feet should be flat on the floor. Inhale, exhale, then slowly contract your abdominal muscles bringing your navel toward your spine. Let your pelvis rock back until your lower back is flat on the floor. Hold for 10 seconds while breathing smoothly.  Abdominal crunch: Perform a pelvic tilt (above) flattening your lower back against the floor. Holding the tension in your abdominal muscles, take another breath and raise your shoulder blades off the ground (this is not a full sit-up). Keep your head in line with your body (don’t bend your neck forward). Hold for 2 seconds, then slowly lower.  Date Last Reviewed: 6/1/2016  © 1075-0628 [employer]. [address] [place] [id number]. All rights reserved. This information is not intended as a substitute for professional medical care. Always follow your healthcare professional's instructions.        Back Pain (Acute or Chronic)    Back pain is  one of the most common problems. The good news is that most people feel better in 1 to 2 weeks, and most of the rest in 1 to 2 months. Most people can remain active.  People who have pain describe it differently--not everyone is the same.  The pain can be sharp, stabbing, shooting, aching, cramping or burning.  Movement, standing, bending, lifting, sitting, or walking may worsen pain.  It can be localized to one spot or area, or it can be more generalized.  It can spread or radiate upwards, to the front, or go down your arms or legs (sciatica).  It can cause muscle spasm.  Most of the time, mechanical problems with the muscles or spine cause the pain. Mechanical problems are usually caused by an injury to the muscles or ligaments. While illness can cause back pain, it is usually not caused by a serious illness. Mechanical problems include:   Physical activity such as sports, exercise, work, or normal activity  Overexertion, lifting, pushing, pulling incorrectly or too aggressively  Sudden twisting, bending, or stretching from an accident, or accidental movement  Poor posture  Stretching or moving wrong, without noticing pain at the time  Poor coordination, lack of regular exercise (check with your doctor about this)  Spinal disc disease or arthritis  Stress  Pain can also be related to pregnancy, or illness like appendicitis, bladder or kidney infections, pelvic infections, and many other things.  Acute back pain usually gets better in 1 to 2 weeks. Back pain related to disk disease, arthritis in the spinal joints or spinal stenosis (narrowing of the spinal canal) can become chronic and last for months or years.  Unless you had a physical injury (for example, a car accident or fall) X-rays are usually not needed for the initial evaluation of back pain. If pain continues and does not respond to medical treatment, X-rays and other tests may be needed.  Home care  Try these home care recommendations:  When in bed, try  to find a position of comfort. A firm mattress is best. Try lying flat on your back with pillows under your knees. You can also try lying on your side with your knees bent up towards your chest and a pillow between your knees.  At first, do not try to stretch out the sore spots. If there is a strain, it is not like the good soreness you get after exercising without an injury. In this case, stretching may make it worse.  Don't sit for long periods, as in a long car ride or during other travel. This puts more stress on the lower back than standing or walking.  During the first 24 to 72 hours after an acute injury or flare up of chronic back pain, apply an ice pack to the painful area for 20 minutes and then remove it for 20 minutes. Do this over a period of 60 to 90 minutes or several times a day. This will reduce swelling and pain. Wrap the ice pack in a thin towel or plastic to protect your skin.  You can start with ice, then switch to heat. Heat (hot shower, hot bath, or heating pad) reduces pain and works well for muscle spasms. Heat can be applied to the painful area for 20 minutes then remove it for 20 minutes. Do this over a period of 60 to 90 minutes or several times a day. Do not sleep on a heating pad. It can lead to skin burns or tissue damage.  You can alternate ice and heat therapy. Talk with your doctor about the best treatment for your back pain.  Therapeutic massage can help relax the back muscles without stretching them.  Be aware of safe lifting methods and do not lift anything without stretching first.  Medicines  Talk to your doctor before using medicine, especially if you have other medical problems or are taking other medicines.  You may use over-the-counter medicine as directed on the bottle to control pain, unless another pain medicine was prescribed. If you have chronic conditions like diabetes, liver or kidney disease, stomach ulcers, or gastrointestinal bleeding, or are taking blood  thinners, talk to your doctor before taking any medicine.  Be careful if you are given a prescription medicines, narcotics, or medicine for muscle spasms. They can cause drowsiness, affect your coordination, reflexes, and judgement. Do not drive or operate heavy machinery.  Follow-up care  Follow up with your healthcare provider, or as advised.   A radiologist will review any X-rays that were taken. Your provide will notify you of any new findings that may affect your care.  Call 911  Call 911 if any of the following occur:  Trouble breathing  Confusion  Very drowsy or trouble awakening  Fainting or loss of consciousness  Rapid or very slow heart rate  Loss of bowel or bladder control  When to seek medical advice  Call your healthcare provider right away if any of these occur:   Pain becomes worse or spreads to your legs  Weakness or numbness in one or both legs  Numbness in the groin or genital area  Date Last Reviewed: 7/1/2016  © 0604-3719 [employer]. [address] [place] [address]. All rights reserved. This information is not intended as a substitute for professional medical care. Always follow your healthcare professional's instructions.        Self-Care for Low Back Pain    Most people have low back pain now and then. In many cases, it isn’t serious and self-care can help. Sometimes low back pain can be a sign of a bigger problem. Call your healthcare provider if your pain returns often or gets worse over time. For the long-term care of your back, get regular exercise, lose any excess weight and learn good posture.  Take a short rest  Lying down during the day may be helpful for short periods of time if severe pain increases with sitting or standing. Long-term bed rest could be damaging.  Reduce pain and swelling  Cold reduces swelling. Both cold and heat can reduce pain. Protect your skin by placing a towel between your body and the ice or heat source.  For the first few days, apply  an ice pack for 15 to 20 minutes, several times a day. To make a cold pack, put ice cubes in a plastic bag that seals at the top.  After the first few days, try heat for 15 minutes at a time to ease pain. Never sleep on a heating pad.  Over-the-counter medicine can help control pain and swelling. Try aspirin or a non-steroidal anti-inflammatory medicine (NSAID) such as ibuprofen.  Exercise  Exercise can help your back heal. It also helps your back get stronger and more flexible, preventing any reinjury. Ask your healthcare provider about specific exercises for your back.  Use good posture to avoid reinjury  When moving, bend at the hips and knees. Don’t bend at the waist or twist around.  When lifting, keep the object close to your body. Lift heavy items using your legs, not your back. Don’t try to lift more than you can handle.  When sitting, keep your lower back supported. Use a rolled-up towel as needed.    Seek medical care right away if:  You can't stand or walk.  You have a temperature over 100.4°F (38.0°C)  You have frequent, painful, or bloody urination.  You have severe abdominal pain.  You have a sharp, stabbing pain.  Your pain is constant.  You have pain, tingling, or numbness in your leg.  You feel pain in a new area of your back.  You notice that the pain isn’t decreasing after more than a week.  Date Last Reviewed: 3/1/2018  © 5327-0377 [employer]. [address] [place] [address], [place] [address]. All rights reserved. This information is not intended as a substitute for professional medical care. Always follow your healthcare professional's instructions.        Relieving Back Pain  Back pain is a common problem. You can strain back muscles by lifting too much weight or just by moving the wrong way. Back strain can be uncomfortable, even painful. And it can take weeks or months to improve. To help yourself feel better and prevent future back strains, try these tips.  Important: Don't give  aspirin to children or teens without first discussing it with your child's healthcare provider.  Ice    Ice reduces muscle pain and swelling. It helps most during the first 24 to 48 hours after an injury.  Wrap an ice pack or a bag of frozen peas in a thin towel. Never put ice directly on your skin.  Place the ice where your back hurts the most.  Don’t ice for more than 20 minutes at a time.  You can use ice several times a day.  Medicines  Over-the-counter pain relievers include acetaminophen and anti-inflammatory medicines, which includes aspirin, naproxen, or ibuprofen. They can help ease discomfort. Some also reduce swelling.  Tell your healthcare provider about any medicines you are already taking.  Take medicines only as directed.  Manipulation and massage  Having manipulation by an osteopathic doctor or chiropractor may be helpful. Getting a massage also may help.   Heat  After the first 48 hours, heat can relax sore muscles and improve blood flow.  Try a warm bath or shower. Or use a heating pad set on low. To prevent a burn, keep a cloth between you and the heating pad.  Don’t use a heating pad for more than 15 minutes at a time. Never sleep on a heating pad.  Date Last Reviewed: 6/1/2018  © 7060-1503 [employer]. [address] [place] [address], [place] [id number]. All rights reserved. This information is not intended as a substitute for professional medical care. Always follow your healthcare professional's instructions.        Back Safety for Healthcare Workers     Bend at your knees and hips when bending down.   Whether you’re moving a patient, lifting a box of supplies, or pushing a cart or wheelchair, your back is always working. Use the tips below to help you reduce your risk of back injury.  Reaching  Reaching for records, files, or supplies, especially in high places, can strain your back:  Reach only as high as your shoulders.  Use a stool or stepladder if you need to get closer to the  load.  Test the weight of the load by pushing up on a corner before lifting. If it’s too heavy, get help.  Bending and lifting  When you’re bending down to reach or lift, move your whole body to protect your back:  Bend your knees and hips, not your back. Don't bend or lift if you are off balance or if your back is twisted.  Kneel down on 1 knee, if necessary.  Get as close to the object as you can, so you won’t have to reach with your arms.  Keep the load close to your body. “Hug” it unless there is a chance it may contain sharps.  Tighten your stomach muscles to support your back when you lift.  Lift with your legs, not your back.  Maintain a wide base of support. Keep feet shoulder-width apart, or one foot slightly in front of the other.  Pushing and pulling  Pulling larger objects can be as hard on your back as lifting. Whenever possible, push instead:  Push with both arms, keeping your elbows bent.  Stay close to the load, without leaning forward.  Tighten your stomach muscles as you push.  Date Last Reviewed: 3/1/2018  © 1162-6614 [employer]. [address] [place] [address]. All rights reserved. This information is not intended as a substitute for professional medical care. Always follow your healthcare professional's instructions.        Caring for Your Back Throughout the Day    Take care of your back throughout the day. You will likely have fewer back problems if you do. Try to warm up before you move. Shift positions often. Also do your best to form healthy habits.  Warm up for the day  Do a few slow, catlike stretches before starting your day. This simple warmup can soften your disks, stretch your back muscles, and help prevent injuries.  Shift positions often  At work and at home, change positions often. This helps keep your body from getting stiff. Stand up or lean back while you sit. If you can, get up and move every 1/2 hour.  Form healthy habits  Here are some suggestions:    Keep a healthy weight. When you weigh too much, your back is under excess strain. But losing just a few extra pounds can help a lot.  Try not to overeat. Learn about serving sizes. The size of a serving depends on the food and the food group. Many foods list serving sizes on the labels.  Handle minor aches with cold and heat. Apply cold the first 24 to 48 hours. Use heat after that. Always place a thin cloth between your skin and the source of cold or heat.  Take medicines as directed. This helps keep pain under control. Always read labels, and call your healthcare provider or pharmacist if you have any questions.  Walk each day  A daily walk keeps your back and thigh muscles stretched and strong. This gives your back better support. Be sure to walk with your spine’s three curves aligned, by keeping your head, hips, and toes connected by a vertical line.  Date Last Reviewed: 5/1/2018  © 2506-3206 [employer]. [address] [place] [address]. All rights reserved. This information is not intended as a substitute for professional medical care. Always follow your healthcare professional's instructions.        How Your Back Works  A healthy back lets you bend and stretch without pain. The spine has 3 natural curves. These keep your body balanced. Strong, flexible muscles support your spine. Soft, cushioning disks separate the hard bones of your spine. The disks let your spine bend and move.    The parts of the spine  The vertebrae are the 24 bones that make up the spine.  The spinous process is the part of each vertebra you can feel through your skin.  Each of these bones has a central hole that runs top to bottom. Together these holes form a tunnel called the spinal canal.  The lamina of each vertebra forms the back of the spinal canal.  Running through the canal are nerves. They are attached in a bundle called the spinal cord for most of the canal.  A foramen is a small opening where a spinal  nerve root leaves the spinal canal.  Disks serve as cushions between vertebrae. A disk’s soft center absorbs shock during movement.     Two vertebrae and a disk     The supporting muscles  Strong, flexible muscles help maintain your 3 natural curves. They hold your spine in correct alignment. This helps support your upper body. Strong core muscles help take the strain off your back. These include the stomach, buttock, and thigh muscles.  Date Last Reviewed: 1/1/2018  © 3425-4640 [employer]. [address] [place] [address]. All rights reserved. This information is not intended as a substitute for professional medical care. Always follow your healthcare professional's instructions.        Relieving Tension in Your Back  Being relaxed helps keep your mind healthy and your back ready to move. Take short breaks often. Walk around. Stretch. Switch tasks. Also give the following a try.  Make time to relax. Start by setting aside 5 minutes daily.  Deep breathing    Deep breathing is a simple way to reduce stress. You can do it almost any time you need to relax.  Inhale slowly through your nose. Let your lungs and stomach expand.  Hold your breath for 2 to 3 seconds.  Exhale slowly through your mouth until your lungs feel empty. Repeat 3 to 4 times.  Relieve tension  Muscle tension can create tender spots called trigger points. The tips below may help relieve muscle tension.  Press the trigger point if you can reach it. If not, lie on a soft tennis ball, or ask a friend to press the spot. Use steady pressure for 10 to 15 seconds. Breathe deeply. Repeat a few times.  Massage trigger points with ice for 2 to 5 minutes. Press lightly at first. Slowly increase firmness.  Date Last Reviewed: 5/1/2018  © 6723-4300 [employer]. [address] [place] [address]. All rights reserved. This information is not intended as a substitute for professional medical care. Always follow your  healthcare professional's instructions.        Back Exercises: Lower Back Rotation    To start, lie on your back with your knees bent and feet flat on the floor. Don’t press your neck or lower back to the floor. Breathe deeply. You should feel comfortable and relaxed in this position.  Drop both knees to one side. Turn your head to the other side. Keep your shoulders flat on the floor.  Do not push through pain.  Hold for 20 seconds.  Slowly switch sides.  Repeat 2 to 5 times.  Date Last Reviewed: 3/1/2018  © 9193-9988 [employer]. [address] [place] [address]. All rights reserved. This information is not intended as a substitute for professional medical care. Always follow your healthcare professional's instructions.        Back Exercises: Lower Back Stretch    To start, sit in a chair with your feet flat on the floor. Shift your weight slightly forward. Relax, and keep your ears, shoulders, and hips aligned while you do the following:  Sit with your feet well apart.  Bend forward and touch the floor with the backs of your hands. Relax and let your body drop.  Hold for 20 seconds. Return to starting position.  Repeat 2 times.   Date Last Reviewed: 11/1/2017  © 2270-1331 [employer]. [address] [place] [address]. All rights reserved. This information is not intended as a substitute for professional medical care. Always follow your healthcare professional's instructions.        Back Exercises: Seated Rotation    To start, sit in a chair with your feet flat on the floor. Shift your weight slightly forward to avoid rounding your back. Relax, and keep your ears, shoulders, and hips aligned:  Fold your arms and elbows just below shoulder height.  Turn from the waist with hips forward. Turn your head last. Do not push through the pain.  Hold for a count of 10 to 30. Return to starting position.  Repeat 3 to 5 times on one side. Then switch sides.  Date Last Reviewed:  3/1/2018  © 9976-1885 [employer]. [address] [place] [address], [place] [address]. All rights reserved. This information is not intended as a substitute for professional medical care. Always follow your healthcare professional's instructions.         Orders Placed This Encounter   Procedures    Lipid Panel    Comp Metabolic Panel (14)    CBC With Differential With Platelet    Ferritin    Iron And Tibc    Vitamin D    URINALYSIS, AUTO, W/O SCOPE    Prevnar 20 (PCV20) [38200]    Advance Care Planning- 1st 30 minutes       Meds This Visit:  Requested Prescriptions      No prescriptions requested or ordered in this encounter       Imaging & Referrals:  OPHTHALMOLOGY - INTERNAL  PCV20 VACCINE FOR INTRAMUSCULAR USE  XR DEXA BONE DENSITOMETRY (CPT=77080)      RTC 1 yr. for physical.

## 2024-12-18 ENCOUNTER — HOSPITAL ENCOUNTER (OUTPATIENT)
Dept: BONE DENSITY | Age: 79
Discharge: HOME | End: 2024-12-18
Attending: INTERNAL MEDICINE
Payer: MEDICARE

## 2024-12-18 DIAGNOSIS — M81.0 AGE-RELATED OSTEOPOROSIS WITHOUT CURRENT PATHOLOGICAL FRACTURE: ICD-10-CM

## 2024-12-18 DIAGNOSIS — M85.80 SENILE OSTEOPENIA: ICD-10-CM

## 2024-12-18 PROCEDURE — 77080 DXA BONE DENSITY AXIAL: CPT | Performed by: INTERNAL MEDICINE

## 2024-12-27 ENCOUNTER — HOSPITAL ENCOUNTER (OUTPATIENT)
Dept: MAMMOGRAPHY | Age: 79
Discharge: HOME | End: 2024-12-27
Attending: INTERNAL MEDICINE
Payer: MEDICARE

## 2024-12-27 DIAGNOSIS — Z12.31 ENCOUNTER FOR SCREENING MAMMOGRAM FOR BREAST CANCER: ICD-10-CM

## 2024-12-27 PROCEDURE — 77063 BREAST TOMOSYNTHESIS BI: CPT | Performed by: INTERNAL MEDICINE

## 2024-12-27 PROCEDURE — 77067 SCR MAMMO BI INCL CAD: CPT | Performed by: INTERNAL MEDICINE

## 2025-01-05 ENCOUNTER — LAB ENCOUNTER (OUTPATIENT)
Dept: LAB | Facility: HOSPITAL | Age: 80
End: 2025-01-05
Attending: INTERNAL MEDICINE
Payer: MEDICARE

## 2025-01-05 DIAGNOSIS — E55.9 VITAMIN D DEFICIENCY: ICD-10-CM

## 2025-01-05 DIAGNOSIS — E78.00 HYPERCHOLESTEROLEMIA: ICD-10-CM

## 2025-01-05 DIAGNOSIS — D50.0 IRON DEFICIENCY ANEMIA DUE TO CHRONIC BLOOD LOSS: ICD-10-CM

## 2025-01-05 LAB
ALBUMIN SERPL-MCNC: 4.4 G/DL (ref 3.2–4.8)
ALBUMIN/GLOB SERPL: 2.2 {RATIO} (ref 1–2)
ALP LIVER SERPL-CCNC: 64 U/L
ALT SERPL-CCNC: 17 U/L
ANION GAP SERPL CALC-SCNC: 6 MMOL/L (ref 0–18)
AST SERPL-CCNC: 15 U/L (ref ?–34)
BASOPHILS # BLD AUTO: 0.05 X10(3) UL (ref 0–0.2)
BASOPHILS NFR BLD AUTO: 0.9 %
BILIRUB SERPL-MCNC: 0.6 MG/DL (ref 0.2–1.1)
BUN BLD-MCNC: 15 MG/DL (ref 9–23)
BUN/CREAT SERPL: 17.6 (ref 10–20)
CALCIUM BLD-MCNC: 9.7 MG/DL (ref 8.7–10.4)
CHLORIDE SERPL-SCNC: 105 MMOL/L (ref 98–112)
CHOLEST SERPL-MCNC: 174 MG/DL (ref ?–200)
CO2 SERPL-SCNC: 30 MMOL/L (ref 21–32)
CREAT BLD-MCNC: 0.85 MG/DL
DEPRECATED HBV CORE AB SER IA-ACNC: 118 NG/ML
DEPRECATED RDW RBC AUTO: 42.8 FL (ref 35.1–46.3)
EGFRCR SERPLBLD CKD-EPI 2021: 70 ML/MIN/1.73M2 (ref 60–?)
EOSINOPHIL # BLD AUTO: 0.24 X10(3) UL (ref 0–0.7)
EOSINOPHIL NFR BLD AUTO: 4.1 %
ERYTHROCYTE [DISTWIDTH] IN BLOOD BY AUTOMATED COUNT: 12.9 % (ref 11–15)
FASTING PATIENT LIPID ANSWER: YES
FASTING STATUS PATIENT QL REPORTED: YES
GLOBULIN PLAS-MCNC: 2 G/DL (ref 2–3.5)
GLUCOSE BLD-MCNC: 89 MG/DL (ref 70–99)
HCT VFR BLD AUTO: 41 %
HDLC SERPL-MCNC: 55 MG/DL (ref 40–59)
HGB BLD-MCNC: 14.3 G/DL
IMM GRANULOCYTES # BLD AUTO: 0.01 X10(3) UL (ref 0–1)
IMM GRANULOCYTES NFR BLD: 0.2 %
IRON SATN MFR SERPL: 56 %
IRON SERPL-MCNC: 184 UG/DL
LDLC SERPL CALC-MCNC: 87 MG/DL (ref ?–100)
LYMPHOCYTES # BLD AUTO: 1.46 X10(3) UL (ref 1–4)
LYMPHOCYTES NFR BLD AUTO: 25.2 %
MCH RBC QN AUTO: 31.7 PG (ref 26–34)
MCHC RBC AUTO-ENTMCNC: 34.9 G/DL (ref 31–37)
MCV RBC AUTO: 90.9 FL
MONOCYTES # BLD AUTO: 0.51 X10(3) UL (ref 0.1–1)
MONOCYTES NFR BLD AUTO: 8.8 %
NEUTROPHILS # BLD AUTO: 3.52 X10 (3) UL (ref 1.5–7.7)
NEUTROPHILS # BLD AUTO: 3.52 X10(3) UL (ref 1.5–7.7)
NEUTROPHILS NFR BLD AUTO: 60.8 %
NONHDLC SERPL-MCNC: 119 MG/DL (ref ?–130)
OSMOLALITY SERPL CALC.SUM OF ELEC: 292 MOSM/KG (ref 275–295)
PLATELET # BLD AUTO: 309 10(3)UL (ref 150–450)
POTASSIUM SERPL-SCNC: 4.7 MMOL/L (ref 3.5–5.1)
PROT SERPL-MCNC: 6.4 G/DL (ref 5.7–8.2)
RBC # BLD AUTO: 4.51 X10(6)UL
SODIUM SERPL-SCNC: 141 MMOL/L (ref 136–145)
TIBC SERPL-MCNC: 331 UG/DL (ref 250–425)
TRANSFERRIN SERPL-MCNC: 222 MG/DL (ref 250–380)
TRIGL SERPL-MCNC: 187 MG/DL (ref 30–149)
VIT D+METAB SERPL-MCNC: 34.9 NG/ML (ref 30–100)
VLDLC SERPL CALC-MCNC: 30 MG/DL (ref 0–30)
WBC # BLD AUTO: 5.8 X10(3) UL (ref 4–11)

## 2025-01-05 PROCEDURE — 85025 COMPLETE CBC W/AUTO DIFF WBC: CPT

## 2025-01-05 PROCEDURE — 80053 COMPREHEN METABOLIC PANEL: CPT

## 2025-01-05 PROCEDURE — 80061 LIPID PANEL: CPT

## 2025-01-05 PROCEDURE — 82306 VITAMIN D 25 HYDROXY: CPT

## 2025-01-05 PROCEDURE — 84466 ASSAY OF TRANSFERRIN: CPT

## 2025-01-05 PROCEDURE — 82728 ASSAY OF FERRITIN: CPT

## 2025-01-05 PROCEDURE — 36415 COLL VENOUS BLD VENIPUNCTURE: CPT

## 2025-01-05 PROCEDURE — 83540 ASSAY OF IRON: CPT

## 2025-01-06 NOTE — PROGRESS NOTES
CBC Normal (white blood cells and red blood cells and platelets), iron storage looks okay.  CMP Normal (electrolytes, sugar, kidney and liver functions),   Lipid (choilesterol) is worse.  Triglycerides should be less than 150.  Triglycerides elevated higher carbs.  Lower carbs with lower triglycerides.  Recheck in 3 to 6 months.  Orders written.  Vitamin D level looks good.  Goal is between 30 and 60.

## 2025-01-30 ENCOUNTER — APPOINTMENT (OUTPATIENT)
Dept: URBAN - METROPOLITAN AREA CLINIC 244 | Age: 80
Setting detail: DERMATOLOGY
End: 2025-01-30

## 2025-01-30 DIAGNOSIS — L81.4 OTHER MELANIN HYPERPIGMENTATION: ICD-10-CM

## 2025-01-30 DIAGNOSIS — L82.1 OTHER SEBORRHEIC KERATOSIS: ICD-10-CM

## 2025-01-30 DIAGNOSIS — Z12.83 ENCOUNTER FOR SCREENING FOR MALIGNANT NEOPLASM OF SKIN: ICD-10-CM

## 2025-01-30 DIAGNOSIS — D22 MELANOCYTIC NEVI: ICD-10-CM

## 2025-01-30 PROBLEM — D22.9 MELANOCYTIC NEVI, UNSPECIFIED: Status: ACTIVE | Noted: 2025-01-30

## 2025-01-30 PROBLEM — D48.5 NEOPLASM OF UNCERTAIN BEHAVIOR OF SKIN: Status: ACTIVE | Noted: 2025-01-30

## 2025-01-30 PROCEDURE — 11102 TANGNTL BX SKIN SINGLE LES: CPT | Mod: 59

## 2025-01-30 PROCEDURE — 99213 OFFICE O/P EST LOW 20 MIN: CPT | Mod: 25

## 2025-01-30 PROCEDURE — OTHER COUNSELING: OTHER

## 2025-01-30 PROCEDURE — OTHER MIPS QUALITY: OTHER

## 2025-01-30 PROCEDURE — 11421 EXC H-F-NK-SP B9+MARG 0.6-1: CPT

## 2025-01-30 PROCEDURE — OTHER BIOPSY BY SHAVE METHOD: OTHER

## 2025-01-30 PROCEDURE — OTHER SHAVE REMOVAL: OTHER

## 2025-01-30 ASSESSMENT — LOCATION DETAILED DESCRIPTION DERM: LOCATION DETAILED: RIGHT MEDIAL DORSAL FOOT

## 2025-01-30 ASSESSMENT — LOCATION SIMPLE DESCRIPTION DERM: LOCATION SIMPLE: RIGHT FOOT

## 2025-01-30 ASSESSMENT — LOCATION ZONE DERM: LOCATION ZONE: FEET

## 2025-01-30 NOTE — HPI: FULL BODY SKIN EXAMINATION
What Is The Reason For Today's Visit?: Full Body Skin Examination
What Is The Reason For Today's Visit? (Being Monitored For X): concerning skin lesions on an annual basis
Additional History: Has a wart on her left arm and would like it removed

## 2025-01-30 NOTE — PROCEDURE: SHAVE REMOVAL
Detail Level: Detailed
Biopsy Method: double edge Personna blade
Lab: -[id number]
Anesthesia Type: 0.5% lidocaine with 1:200,000 epinephrine and a 1:10 solution of 8.4% sodium bicarbonate
Notification Instructions: Patient will be notified of pathology results. However, patient instructed to call the office if not contacted within 2 weeks.
Billing Type: Third-Party Bill
Wound Care: Petrolatum
Hemostasis: Drysol
Was A Bandage Applied: Yes
Medical Necessity Information: It is in your best interest to select a reason for this procedure from the list below. All of these items fulfill various CMS LCD requirements except the new and changing color options.
Render Path Notes In Note?: No
Post-Care Instructions: I reviewed with the patient in detail post-care instructions.\\n\\n Following the removal petrolatum and a bandage were applied. Patient will be notified of biopsy results. However, patient instructed to call the office if not contacted within 2 weeks. Reviewed with the patient in detail post-care instructions. Patient is to keep the area dry for 48 hours, and not to engage in any swimming until the area is healed. If patient does have water exposure, recommend waterproof (hydrocolloid dressing) application. Should the patient develop any fevers, chills, bleeding, severe pain, then the patient will contact the office immediately.\\n\\n** The patient was explicitly instructed to NOT apply any other topicals to the area other than plain vaseline with a clean Q-tip **. No Neosporin or topical Ab unless prescribed/recommended by the Dr is to be used.\\n\\nThe patient is aware to have the area re-evaluated if it is not healed
Anesthesia Volume In Cc: 1
X Size Of Lesion In Cm (Optional): 0
Size Of Lesion In Cm (Required): 0.7
Medical Necessity Clause: This procedure was medically necessary because the lesion that was treated was:
Size Of Margin In Cm (Margins Are Not Added To Billing Dimensions): 0.2
Depth Of Shave: dermis
Consent was obtained from the patient. The risks and benefits were discussed in detail. Specifically, the risks of infection, scarring, scabbing, bleeding, prolonged wound healing, incomplete removal, allergy to anesthesia, nerve injury and recurrence were addressed. Prior to the procedure, the treatment site was clearly identified and confirmed by the patient. The scar produced with a shave removal typically appears atrophic and white but can be raised / hypertrophic / keloidal.  Patient aware biopsy is done for medical purposes (not cosmetic).Patient aware that sometimes removal/biopsy results can be inconclusive and that another biopsy or procedure may be required. Patient aware that another procedure may be required pending the diagnosis.

## 2025-01-30 NOTE — PROCEDURE: BIOPSY BY SHAVE METHOD
Hide Anticipated Plan (Based On Presumed Biopsy Results)?: No
Silver Nitrate Text: The wound bed was treated with silver nitrate after the biopsy was performed.
Lab: -[id number]
Electrodesiccation Text: The wound bed was treated with electrodesiccation after the biopsy was performed.
Anesthesia Volume In Cc: 0.5
Electrodesiccation And Curettage Text: The wound bed was treated with electrodesiccation and curettage after the biopsy was performed.
Biopsy Method: double edge Personna blade
Additional Anesthesia Volume In Cc (Will Not Render If 0): 0
Dressing: bandage
Curettage Text: The wound bed was treated with curettage after the biopsy was performed.
Depth Of Biopsy: dermis
Consent: Written consent was obtained and risks were reviewed including but not limited to scarring, infection, bleeding, scabbing, incomplete removal, nerve damage and allergy to anesthesia.
Detail Level: Detailed
Biopsy Type: H and E
Wound Care: Petrolatum
Information: Selecting Yes will display possible errors in your note based on the variables you have selected. This validation is only offered as a suggestion for you. PLEASE NOTE THAT THE VALIDATION TEXT WILL BE REMOVED WHEN YOU FINALIZE YOUR NOTE. IF YOU WANT TO FAX A PRELIMINARY NOTE YOU WILL NEED TO TOGGLE THIS TO 'NO' IF YOU DO NOT WANT IT IN YOUR FAXED NOTE.
Post-Care Instructions: I reviewed with the patient in detail post-care instructions. Patient is to keep the biopsy site dry overnight, and then apply bacitracin twice daily until healed. Patient may apply hydrogen peroxide soaks to remove any crusting.
Billing Type: Third-Party Bill
Cryotherapy Text: The wound bed was treated with cryotherapy after the biopsy was performed.
Type Of Destruction Used: Curettage
Notification Instructions: Patient will be notified of biopsy results. However, patient instructed to call the office if not contacted within 2 weeks.
Was A Bandage Applied: Yes
Anesthesia Type: 0.5% lidocaine with 1:100,000 epinephrine and a 1:10 solution of 8.4% sodium bicarbonate
Hemostasis: Drysol

## 2025-02-13 ENCOUNTER — APPOINTMENT (OUTPATIENT)
Dept: URBAN - METROPOLITAN AREA CLINIC 244 | Age: 80
Setting detail: DERMATOLOGY
End: 2025-02-14

## 2025-02-13 DIAGNOSIS — Z48.02 ENCOUNTER FOR REMOVAL OF SUTURES: ICD-10-CM

## 2025-02-13 PROCEDURE — 99024 POSTOP FOLLOW-UP VISIT: CPT

## 2025-02-13 PROCEDURE — OTHER SUTURE REMOVAL (GLOBAL PERIOD): OTHER

## 2025-02-13 ASSESSMENT — LOCATION DETAILED DESCRIPTION DERM: LOCATION DETAILED: RIGHT MEDIAL DORSAL FOOT

## 2025-02-13 ASSESSMENT — LOCATION SIMPLE DESCRIPTION DERM: LOCATION SIMPLE: RIGHT FOOT

## 2025-02-13 ASSESSMENT — LOCATION ZONE DERM: LOCATION ZONE: FEET

## 2025-02-13 NOTE — PROCEDURE: SUTURE REMOVAL (GLOBAL PERIOD)
Detail Level: Detailed
Add 03046 Cpt? (Important Note: In 2017 The Use Of 92144 Is Being Tracked By Cms To Determine Future Global Period Reimbursement For Global Periods): yes

## 2025-02-14 RX ORDER — BETAMETHASONE DIPROPIONATE 0.05 %
1 OINTMENT (GRAM) TOPICAL NIGHTLY
Qty: 50 G | Refills: 1 | Status: SHIPPED | OUTPATIENT
Start: 2025-02-14

## 2025-02-14 NOTE — TELEPHONE ENCOUNTER
Please review; protocol failed/No Protocol    Last Office Visit: 11/19/2024    Patient uses prn     Requested Prescriptions   Pending Prescriptions Disp Refills    betamethasone dipropionate 0.05 % External Ointment 50 g 1     Sig: Apply 1 g topically nightly.       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

## 2025-02-17 ENCOUNTER — MED REC SCAN ONLY (OUTPATIENT)
Dept: INTERNAL MEDICINE CLINIC | Facility: CLINIC | Age: 80
End: 2025-02-17

## 2025-04-07 ENCOUNTER — PATIENT MESSAGE (OUTPATIENT)
Dept: INTERNAL MEDICINE CLINIC | Facility: CLINIC | Age: 80
End: 2025-04-07

## 2025-04-07 NOTE — TELEPHONE ENCOUNTER
Routed to Dr Thomas for advise, thanks.      Future Appointments   Date Time Provider Department Center   11/20/2025  2:00 PM Maya Thomas MD [id number] [place] [id number]

## 2025-04-10 NOTE — TELEPHONE ENCOUNTER
We can put her in as a new patient but will be able to do urgent's or any urgent need to just put her in for new patient physical establish care when we can availability.

## 2025-04-14 NOTE — TELEPHONE ENCOUNTER
Spoke to patient  she will let her daughter call to schedule a new patient appointment per dr jeter

## 2025-05-21 ENCOUNTER — PATIENT MESSAGE (OUTPATIENT)
Dept: INTERNAL MEDICINE CLINIC | Facility: CLINIC | Age: 80
End: 2025-05-21

## 2025-05-21 DIAGNOSIS — Z12.31 BREAST CANCER SCREENING BY MAMMOGRAM: Primary | ICD-10-CM

## 2025-06-25 RX ORDER — ATORVASTATIN CALCIUM 20 MG/1
20 TABLET, FILM COATED ORAL NIGHTLY
Qty: 90 TABLET | Refills: 3 | Status: SHIPPED | OUTPATIENT
Start: 2025-06-25

## 2025-06-25 NOTE — TELEPHONE ENCOUNTER
Refill Per Protocol     Requested Prescriptions   Pending Prescriptions Disp Refills    ATORVASTATIN 20 MG Oral Tab [Pharmacy Med Name: Atorvastatin Calcium 20 Mg Tab Nort] 90 tablet 0     Sig: TAKE 1 TABLET BY MOUTH NIGHTLY.       Cholesterol Medication Protocol Passed - 6/25/2025 11:19 AM        Passed - ALT < 80     Lab Results   Component Value Date    ALT 17 01/05/2025             Passed - ALT resulted within past year        Passed - Lipid panel within past 12 months     Lab Results   Component Value Date    CHOLEST 174 01/05/2025    TRIG 187 (H) 01/05/2025    HDL 55 01/05/2025    LDL 87 01/05/2025    VLDL 30 01/05/2025    NONHDLC 119 01/05/2025             Passed - In person appointment or virtual visit in the past 12 mos or appointment in next 3 mos     Recent Outpatient Visits              7 months ago Age-related nuclear cataract of both eyes    [facility] Maya Thomas MD    Office Visit    [facility]    [facility]    [facility]    1 year ago [facility]    [facility] [place]    [place]    2 years ago COVID-19    [facility] [place] Harini Elam APRN    Telemedicine          Future Appointments         Provider Department Appt Notes    In 4 months Maya Thomas MD [facility] physical    In 6 months ADO DEXA RM1; ADO DYLLAN RM1 [facility] Mammography - [place]                     Passed - Medication is active on med list

## 2025-07-08 RX ORDER — LISINOPRIL 10 MG/1
10 TABLET ORAL DAILY
Qty: 90 TABLET | Refills: 3 | Status: SHIPPED | OUTPATIENT
Start: 2025-07-08

## (undated) DIAGNOSIS — R53.83 FATIGUE, UNSPECIFIED TYPE: Primary | ICD-10-CM

## (undated) DIAGNOSIS — D64.9 ANEMIA, UNSPECIFIED TYPE: Primary | ICD-10-CM

## (undated) NOTE — LETTER
11/12/2019          To Whom It May Concern:    Stewart Rivers is currently under my medical care and may excerise. If you require additional information please contact our office. Sincerely,    Keegan Pérez.  Janes Martinez MD          Document generated b

## (undated) NOTE — LETTER
12/16/2019              [facility] [address]         Dear Darci Willoughby,      It was a pleasure to see you at our [address] [place] [address] office.  Your Mammogram is normal, r

## (undated) NOTE — LETTER
Department: [facility], [address], [place]  Phone: [phone]  Ordering Provider: Casey Chamorro Ordering Provider Address: [address]  [address] [facility] [place] [address]  Ordering Provider Phone: [phone]  Ordering Provider Fax: [phone]

## (undated) NOTE — MR AVS SNAPSHOT
[address] [facility] [address]  [phone]               Thank you for choosing us for your health care visit with Hubert Goetz MD.  We are glad to serve you and happy to provide you with this summary of your Results of Recent Testing       MyChart     Visit MyChart  You can access your MyChart to more actively manage your health care and view more details from this visit by going to [email or web address].   If you've recently had a stay at the [facility] y EAT THESE FOODS MORE OFTEN: EAT THESE FOODS LESS OFTEN:   Make half your plate fruits and vegetables Highly refined, white starches including white bread, rice and pasta   Eat plenty of protein, keep the fat content low Sugars:  sodas and sports drinks, ca